# Patient Record
Sex: FEMALE | Race: WHITE | NOT HISPANIC OR LATINO | Employment: OTHER | URBAN - NONMETROPOLITAN AREA
[De-identification: names, ages, dates, MRNs, and addresses within clinical notes are randomized per-mention and may not be internally consistent; named-entity substitution may affect disease eponyms.]

---

## 2020-05-20 ENCOUNTER — APPOINTMENT (EMERGENCY)
Dept: CT IMAGING | Facility: HOSPITAL | Age: 85
End: 2020-05-20
Payer: COMMERCIAL

## 2020-05-20 ENCOUNTER — HOSPITAL ENCOUNTER (EMERGENCY)
Facility: HOSPITAL | Age: 85
Discharge: HOME | End: 2020-05-20
Attending: EMERGENCY MEDICINE | Admitting: EMERGENCY MEDICINE
Payer: COMMERCIAL

## 2020-05-20 VITALS
DIASTOLIC BLOOD PRESSURE: 68 MMHG | TEMPERATURE: 98.4 F | HEART RATE: 60 BPM | RESPIRATION RATE: 18 BRPM | SYSTOLIC BLOOD PRESSURE: 154 MMHG | OXYGEN SATURATION: 99 % | WEIGHT: 137.57 LBS

## 2020-05-20 DIAGNOSIS — M25.552 LEFT HIP PAIN: Primary | ICD-10-CM

## 2020-05-20 LAB
ANION GAP SERPL CALCULATED.3IONS-SCNC: 7 MMOL/L (ref 4–13)
BASOPHILS # BLD AUTO: 0.04 THOUSANDS/ΜL (ref 0–0.1)
BASOPHILS NFR BLD AUTO: 1 % (ref 0–1)
BUN SERPL-MCNC: 17 MG/DL (ref 5–25)
CALCIUM SERPL-MCNC: 8.7 MG/DL (ref 8.3–10.1)
CHLORIDE SERPL-SCNC: 95 MMOL/L (ref 100–108)
CO2 SERPL-SCNC: 29 MMOL/L (ref 21–32)
CREAT SERPL-MCNC: 1.1 MG/DL (ref 0.6–1.3)
EOSINOPHIL # BLD AUTO: 0.31 THOUSAND/ΜL (ref 0–0.61)
EOSINOPHIL NFR BLD AUTO: 5 % (ref 0–6)
ERYTHROCYTE [DISTWIDTH] IN BLOOD BY AUTOMATED COUNT: 12.9 % (ref 11.6–15.1)
GFR SERPL CREATININE-BSD FRML MDRD: 46 ML/MIN/1.73SQ M
GLUCOSE SERPL-MCNC: 101 MG/DL (ref 65–140)
HCT VFR BLD AUTO: 33 % (ref 34.8–46.1)
HGB BLD-MCNC: 11.1 G/DL (ref 11.5–15.4)
IMM GRANULOCYTES # BLD AUTO: 0.02 THOUSAND/UL (ref 0–0.2)
IMM GRANULOCYTES NFR BLD AUTO: 0 % (ref 0–2)
LYMPHOCYTES # BLD AUTO: 1.42 THOUSANDS/ΜL (ref 0.6–4.47)
LYMPHOCYTES NFR BLD AUTO: 24 % (ref 14–44)
MCH RBC QN AUTO: 32.5 PG (ref 26.8–34.3)
MCHC RBC AUTO-ENTMCNC: 33.6 G/DL (ref 31.4–37.4)
MCV RBC AUTO: 97 FL (ref 82–98)
MONOCYTES # BLD AUTO: 0.47 THOUSAND/ΜL (ref 0.17–1.22)
MONOCYTES NFR BLD AUTO: 8 % (ref 4–12)
NEUTROPHILS # BLD AUTO: 3.74 THOUSANDS/ΜL (ref 1.85–7.62)
NEUTS SEG NFR BLD AUTO: 62 % (ref 43–75)
NRBC BLD AUTO-RTO: 0 /100 WBCS
PLATELET # BLD AUTO: 238 THOUSANDS/UL (ref 149–390)
PMV BLD AUTO: 8.4 FL (ref 8.9–12.7)
POTASSIUM SERPL-SCNC: 3.8 MMOL/L (ref 3.5–5.3)
RBC # BLD AUTO: 3.42 MILLION/UL (ref 3.81–5.12)
SODIUM SERPL-SCNC: 131 MMOL/L (ref 136–145)
WBC # BLD AUTO: 6 THOUSAND/UL (ref 4.31–10.16)

## 2020-05-20 PROCEDURE — 96375 TX/PRO/DX INJ NEW DRUG ADDON: CPT

## 2020-05-20 PROCEDURE — 99284 EMERGENCY DEPT VISIT MOD MDM: CPT

## 2020-05-20 PROCEDURE — 85025 COMPLETE CBC W/AUTO DIFF WBC: CPT | Performed by: EMERGENCY MEDICINE

## 2020-05-20 PROCEDURE — 36415 COLL VENOUS BLD VENIPUNCTURE: CPT | Performed by: EMERGENCY MEDICINE

## 2020-05-20 PROCEDURE — 80048 BASIC METABOLIC PNL TOTAL CA: CPT | Performed by: EMERGENCY MEDICINE

## 2020-05-20 PROCEDURE — 99285 EMERGENCY DEPT VISIT HI MDM: CPT | Performed by: EMERGENCY MEDICINE

## 2020-05-20 PROCEDURE — 96361 HYDRATE IV INFUSION ADD-ON: CPT

## 2020-05-20 PROCEDURE — 74176 CT ABD & PELVIS W/O CONTRAST: CPT

## 2020-05-20 PROCEDURE — 96374 THER/PROPH/DIAG INJ IV PUSH: CPT

## 2020-05-20 RX ORDER — LOSARTAN POTASSIUM AND HYDROCHLOROTHIAZIDE 12.5; 1 MG/1; MG/1
1 TABLET ORAL DAILY
COMMUNITY
Start: 2019-08-16

## 2020-05-20 RX ORDER — MORPHINE SULFATE 4 MG/ML
4 INJECTION, SOLUTION INTRAMUSCULAR; INTRAVENOUS ONCE
Status: COMPLETED | OUTPATIENT
Start: 2020-05-20 | End: 2020-05-20

## 2020-05-20 RX ORDER — TRAMADOL HYDROCHLORIDE 50 MG/1
50 TABLET ORAL AS NEEDED
COMMUNITY
Start: 2020-05-18 | End: 2020-05-28

## 2020-05-20 RX ORDER — ONDANSETRON 4 MG/1
4 TABLET, FILM COATED ORAL EVERY 6 HOURS PRN
Qty: 10 TABLET | Refills: 0 | Status: SHIPPED | OUTPATIENT
Start: 2020-05-20

## 2020-05-20 RX ORDER — ONDANSETRON 2 MG/ML
4 INJECTION INTRAMUSCULAR; INTRAVENOUS ONCE
Status: COMPLETED | OUTPATIENT
Start: 2020-05-20 | End: 2020-05-20

## 2020-05-20 RX ORDER — PROPRANOLOL HCL 60 MG
120 CAPSULE, EXTENDED RELEASE 24HR ORAL DAILY
COMMUNITY

## 2020-05-20 RX ORDER — OXYCODONE HYDROCHLORIDE 5 MG/1
5 TABLET ORAL EVERY 6 HOURS PRN
Qty: 8 TABLET | Refills: 0 | Status: SHIPPED | OUTPATIENT
Start: 2020-05-20

## 2020-05-20 RX ORDER — SODIUM CHLORIDE 9 MG/ML
125 INJECTION, SOLUTION INTRAVENOUS CONTINUOUS
Status: DISCONTINUED | OUTPATIENT
Start: 2020-05-20 | End: 2020-05-20 | Stop reason: HOSPADM

## 2020-05-20 RX ORDER — KETOROLAC TROMETHAMINE 30 MG/ML
10 INJECTION, SOLUTION INTRAMUSCULAR; INTRAVENOUS ONCE
Status: COMPLETED | OUTPATIENT
Start: 2020-05-20 | End: 2020-05-20

## 2020-05-20 RX ADMIN — KETOROLAC TROMETHAMINE 9.9 MG: 30 INJECTION, SOLUTION INTRAMUSCULAR at 10:08

## 2020-05-20 RX ADMIN — MORPHINE SULFATE 4 MG: 4 INJECTION INTRAVENOUS at 08:25

## 2020-05-20 RX ADMIN — ONDANSETRON 4 MG: 2 INJECTION INTRAMUSCULAR; INTRAVENOUS at 08:24

## 2020-05-20 RX ADMIN — SODIUM CHLORIDE 125 ML/HR: 0.9 INJECTION, SOLUTION INTRAVENOUS at 08:25

## 2020-05-20 RX ADMIN — MORPHINE SULFATE 2 MG: 2 INJECTION, SOLUTION INTRAMUSCULAR; INTRAVENOUS at 10:08

## 2021-01-28 ENCOUNTER — IMMUNIZATIONS (OUTPATIENT)
Dept: FAMILY MEDICINE CLINIC | Facility: HOSPITAL | Age: 86
End: 2021-01-28

## 2021-01-28 DIAGNOSIS — Z23 ENCOUNTER FOR IMMUNIZATION: Primary | ICD-10-CM

## 2021-01-28 PROCEDURE — 91301 SARS-COV-2 / COVID-19 MRNA VACCINE (MODERNA) 100 MCG: CPT

## 2021-01-28 PROCEDURE — 0011A SARS-COV-2 / COVID-19 MRNA VACCINE (MODERNA) 100 MCG: CPT

## 2021-02-25 ENCOUNTER — IMMUNIZATIONS (OUTPATIENT)
Dept: FAMILY MEDICINE CLINIC | Facility: HOSPITAL | Age: 86
End: 2021-02-25

## 2021-02-25 DIAGNOSIS — Z23 ENCOUNTER FOR IMMUNIZATION: Primary | ICD-10-CM

## 2021-02-25 PROCEDURE — 0012A SARS-COV-2 / COVID-19 MRNA VACCINE (MODERNA) 100 MCG: CPT

## 2021-02-25 PROCEDURE — 91301 SARS-COV-2 / COVID-19 MRNA VACCINE (MODERNA) 100 MCG: CPT

## 2022-02-13 ENCOUNTER — OFFICE VISIT (OUTPATIENT)
Dept: URGENT CARE | Facility: CLINIC | Age: 87
End: 2022-02-13
Payer: COMMERCIAL

## 2022-02-13 ENCOUNTER — APPOINTMENT (OUTPATIENT)
Dept: RADIOLOGY | Facility: CLINIC | Age: 87
End: 2022-02-13
Payer: COMMERCIAL

## 2022-02-13 VITALS — RESPIRATION RATE: 19 BRPM | TEMPERATURE: 99.1 F | OXYGEN SATURATION: 95 % | HEART RATE: 73 BPM

## 2022-02-13 DIAGNOSIS — R05.9 COUGH: ICD-10-CM

## 2022-02-13 DIAGNOSIS — B34.9 VIRAL SYNDROME: Primary | ICD-10-CM

## 2022-02-13 PROCEDURE — 99213 OFFICE O/P EST LOW 20 MIN: CPT

## 2022-02-13 PROCEDURE — 71046 X-RAY EXAM CHEST 2 VIEWS: CPT

## 2022-02-13 PROCEDURE — U0003 INFECTIOUS AGENT DETECTION BY NUCLEIC ACID (DNA OR RNA); SEVERE ACUTE RESPIRATORY SYNDROME CORONAVIRUS 2 (SARS-COV-2) (CORONAVIRUS DISEASE [COVID-19]), AMPLIFIED PROBE TECHNIQUE, MAKING USE OF HIGH THROUGHPUT TECHNOLOGIES AS DESCRIBED BY CMS-2020-01-R: HCPCS

## 2022-02-13 PROCEDURE — U0005 INFEC AGEN DETEC AMPLI PROBE: HCPCS

## 2022-02-13 PROCEDURE — S9083 URGENT CARE CENTER GLOBAL: HCPCS

## 2022-02-13 RX ORDER — RIZATRIPTAN BENZOATE 5 MG/1
TABLET, ORALLY DISINTEGRATING ORAL
COMMUNITY

## 2022-02-13 RX ORDER — PROMETHAZINE HYDROCHLORIDE AND CODEINE PHOSPHATE 6.25; 1 MG/5ML; MG/5ML
SYRUP ORAL
COMMUNITY

## 2022-02-13 NOTE — PATIENT INSTRUCTIONS
You have been swabbed for COVID today  You can expect your results in 24-48 hours  Results will be available on [employer]  I will call you with results also  Please isolate from others until your receive your results  If your results are positive for COVID regardless of vaccination status, you are to isolate for 5 days from symptom onset then continue to wear a mask around others for another 5 days  If you have a fever, continue to stay home until your fever resolves  If you were exposed to someone with COVID and have been boosted or completed the primary series of Pfizer or Moderna vaccine within the last 6 months, or completed the primary series of J&J vaccine within the last 2 months - you are to wear a mask around others for 10 days and test on day 5 if possible  If you completed the primary series of Pfizer or Moderna vaccine over 6 months ago and are not boosted or completed the primary series of J&J over 2 months ago and are not boosted or are not vaccinated - you are to stay home for 5 days then continue to wear a mask around others for another 5 days  If you are unable to quarantine, you must wear a mask for 10 days  You should be fever free for 24 hours without the use of medication before returning to work  Tylenol and ibuprofen as needed for pain and/or fever  Mucinex DM for cough and congestion  Vitamin C 1000 mg, Vitamin D3 2000 units, and Zinc 100 mg help to boost your immunity to fight viruses  Hydrate - water and sports drinks (such as Gatorade, body armour, etc ) are great for hydration  Rest   Follow up with your PCP  Go to ED for worsening symptoms  Viral Syndrome   WHAT YOU NEED TO KNOW:   Viral syndrome is a term used for symptoms of an infection caused by a virus  Viruses are spread easily from person to person through the air and on shared items    DISCHARGE INSTRUCTIONS:   Call your local emergency number (911 in the [address] [place] [address],[address]) or have someone else call if:   · You have a seizure  · You cannot be woken  · You have chest pain or trouble breathing  Return to the emergency department if:   · You have a stiff neck, a bad headache, and sensitivity to light  · You feel weak, dizzy, or confused  · You stop urinating or urinate a lot less than usual     · You cough up blood or thick yellow or green mucus  · You have severe abdominal pain or your abdomen is larger than usual     Call your doctor if:   · Your symptoms do not get better with treatment or get worse after 3 days  · You have a rash or ear pain  · You have burning when you urinate  · You have questions or concerns about your condition or care  Medicines: You may  need any of the following:  · Acetaminophen  decreases pain and fever  It is available without a doctor's order  Ask how much to take and how often to take it  Follow directions  Read the labels of all other medicines you are using to see if they also contain acetaminophen, or ask your doctor or pharmacist  Acetaminophen can cause liver damage if not taken correctly  Do not use more than 4 grams (4,000 milligrams) total of acetaminophen in one day  · NSAIDs , such as ibuprofen, help decrease swelling, pain, and fever  NSAIDs can cause stomach bleeding or kidney problems in certain people  If you take blood thinner medicine, always ask your healthcare provider if NSAIDs are safe for you  Always read the medicine label and follow directions  · Cold medicine  helps decrease swelling, control a cough, and relieve chest or nasal congestion  · Saline nasal spray  helps decrease nasal congestion  · Take your medicine as directed  Contact your healthcare provider if you think your medicine is not helping or if you have side effects  Tell him of her if you are allergic to any medicine  Keep a list of the medicines, vitamins, and herbs you take  Include the amounts, and when and why you take them   Bring the list or the pill bottles to follow-up visits  Carry your medicine list with you in case of an emergency  Manage your symptoms:   · Drink liquids as directed to prevent dehydration  Ask how much liquid to drink each day and which liquids are best for you  Ask if you should drink an oral rehydration solution (ORS)  An ORS has the right amounts of water, salts, and sugar you need to replace body fluids  This may help prevent dehydration caused by vomiting or diarrhea  Do not drink liquids with caffeine  Liquids with caffeine can make dehydration worse  · Get plenty of rest to help your body heal   Take naps throughout the day  Ask your healthcare provider when you can return to work and your normal activities  · Use a cool mist humidifier to help you breathe easier  Ask your healthcare provider how to use a cool mist humidifier  · Eat honey or use cough drops for a sore throat  Cough drops are available without a doctor's order  Follow directions for taking cough drops  · Do not smoke or be close to anyone who is smoking  Nicotine and other chemicals in cigarettes and cigars can cause lung damage  Smoking can also delay healing  Ask your healthcare provider for information if you currently smoke and need help to quit  E-cigarettes or smokeless tobacco still contain nicotine  Talk to your healthcare provider before you use these products  Prevent the spread of germs:       · Wash your hands often  Wash your hands several times each day  Wash after you use the bathroom, change a child's diaper, and before you prepare or eat food  Use soap and water every time  Rub your soapy hands together, lacing your fingers  Wash the front and back of your hands, and in between your fingers  Use the fingers of one hand to scrub under the fingernails of the other hand  Wash for at least 20 seconds  Rinse with warm, running water for several seconds  Then dry your hands with a clean towel or paper towel   Use hand  that contains alcohol if soap and water are not available  Do not touch your eyes, nose, or mouth without washing your hands first          · Cover a sneeze or cough  Use a tissue that covers your mouth and nose  Throw the tissue away in a trash can right away  Use the bend of your arm if a tissue is not available  Wash your hands well with soap and water or use a hand   · Stay away from others while you are sick  Avoid crowds as much as possible  · Ask about vaccines you may need  Talk to your healthcare provider about your vaccine history  He or she will tell you which vaccines you need, and when to get them  ? Get the influenza (flu) vaccine as soon as recommended each year  The flu vaccine is available starting in September or October  Flu viruses change, so it is important to get a flu vaccine every year  ? Get the pneumonia vaccine if recommended  This vaccine is usually recommended every 5 years  Your provider will tell you when to get this vaccine, if needed  Follow up with your doctor as directed:  Write down your questions so you remember to ask them during your visits  © Copyright [employer] 2021 Information is for End User's use only and may not be sold, redistributed or otherwise used for commercial purposes  All illustrations and images included in CareNotes® are the copyrighted property of [employer]  or [facility] Jacobs   The above information is an  only  It is not intended as medical advice for individual conditions or treatments  Talk to your doctor, nurse or pharmacist before following any medical regimen to see if it is safe and effective for you

## 2022-02-13 NOTE — PROGRESS NOTES
[facility]        NAME: Freda Dasilva is a 80 y o  female  : 1934    MRN: [id number]  DATE: 2022  TIME: 4:16 PM    Assessment and Plan   Viral syndrome [B34 9]  1  Viral syndrome  COVID Only -Office Collect    XR chest pa & lateral    dextromethorphan-guaifenesin (MUCINEX DM)  MG per 12 hr tablet    CANCELED: COVID Only -Office Collect     Preliminary chest x-ray - no acute pathology noted  Will review official read when available and notify patient if there are findings and treat accordingly  Patient Instructions     You have been swabbed for COVID today  You can expect your results in 24-48 hours  Results will be available on "[employer]"  I will call you with results also  Please isolate from others until your receive your results  If your results are positive for COVID regardless of vaccination status, you are to isolate for 5 days from symptom onset then continue to wear a mask around others for another 5 days  If you have a fever, continue to stay home until your fever resolves  If you were exposed to someone with COVID and have been boosted or completed the primary series of Pfizer or Moderna vaccine within the last 6 months, or completed the primary series of J&J vaccine within the last 2 months - you are to wear a mask around others for 10 days and test on day 5 if possible  If you completed the primary series of Pfizer or Moderna vaccine over 6 months ago and are not boosted or completed the primary series of J&J over 2 months ago and are not boosted or are not vaccinated - you are to stay home for 5 days then continue to wear a mask around others for another 5 days  If you are unable to quarantine, you must wear a mask for 10 days  You should be fever free for 24 hours without the use of medication before returning to work  Tylenol and ibuprofen as needed for pain and/or fever  Mucinex DM for cough and congestion     Vitamin C 1000 mg, Vitamin D3 2000 units, and Zinc 100 mg help to boost your immunity to fight viruses  Hydrate - water and sports drinks (such as Gatorade, body armour, etc ) are great for hydration  Rest   Follow up with your PCP  Go to ED for worsening symptoms  Chief Complaint     Chief Complaint   Patient presents with    COVID-19     Cough and chest discomfort Feels exhausted  Started friday          History of Present Illness       Patient reports fatigue, sore throat, runny nose, cough, chest congestion, and sinus congestion that started Friday  She reports PCP called her in cough syrup and she notes improvement to cough and congestion  She reports having pneumonia several years ago and is concerned she could have it again  She denies SOB  Review of Systems   Review of Systems   Constitutional: Positive for fatigue  Negative for chills and fever  HENT: Positive for congestion, rhinorrhea and sore throat  Respiratory: Positive for cough  Negative for shortness of breath  Cardiovascular: Negative for chest pain  Gastrointestinal: Negative for diarrhea, nausea and vomiting  Musculoskeletal: Negative for myalgias  Neurological: Negative for headaches  All other systems reviewed and are negative          Current Medications       Current Outpatient Medications:     Cholecalciferol 50 MCG (2000 UT) CAPS, Take 1 tablet by mouth Daily, Disp: , Rfl:     dextromethorphan-guaifenesin (MUCINEX DM)  MG per 12 hr tablet, Take 1 tablet by mouth every 12 (twelve) hours, Disp: 20 tablet, Rfl: 0    losartan-hydrochlorothiazide (HYZAAR) 100-12 5 MG per tablet, Take 1 tablet by mouth daily, Disp: , Rfl:     Multiple Vitamins-Minerals (OCUVITE-LUTEIN PO), Take 1 tablet by mouth daily, Disp: , Rfl:     ondansetron (ZOFRAN) 4 mg tablet, Take 1 tablet (4 mg total) by mouth every 6 (six) hours as needed for nausea or vomiting, Disp: 10 tablet, Rfl: 0    oxyCODONE (ROXICODONE) 5 mg immediate release tablet, Take 1 tablet (5 mg total) by mouth every 6 (six) hours as needed for moderate pain for up to 8 dosesMax Daily Amount: 20 mg, Disp: 8 tablet, Rfl: 0    promethazine-codeine (PHENERGAN WITH CODEINE) 6 25-10 mg/5 mL syrup, , Disp: , Rfl:     propranolol (INDERAL LA) 60 mg 24 hr capsule, Take 120 mg by mouth daily, Disp: , Rfl:     rizatriptan (MAXALT-MLT) 5 MG disintegrating tablet, , Disp: , Rfl:     Current Allergies     Allergies as of 02/13/2022 - Reviewed 05/20/2020   Allergen Reaction Noted    Clarithromycin Dizziness 11/06/2015    Clindamycin  05/20/2020    Levofloxacin Dizziness 11/06/2015            The following portions of the patient's history were reviewed and updated as appropriate: allergies, current medications, past family history, past medical history, past social history, past surgical history and problem list      Past Medical History:   Diagnosis Date    Hypertension     Sciatica        Past Surgical History:   Procedure Laterality Date    APPENDECTOMY         No family history on file  Medications have been verified  Objective   Pulse 73   Temp 99 1 °F (37 3 °C)   Resp 19   LMP  (LMP Unknown)   SpO2 95%        Physical Exam     Physical Exam  Vitals and nursing note reviewed  Constitutional:       General: She is not in acute distress  Appearance: Normal appearance  She is not toxic-appearing  HENT:      Head: Normocephalic and atraumatic  Right Ear: External ear normal       Left Ear: External ear normal       Nose: Congestion and rhinorrhea present  Right Turbinates: Swollen  Left Turbinates: Swollen  Mouth/Throat:      Mouth: Mucous membranes are moist       Pharynx: Oropharynx is clear  No oropharyngeal exudate or posterior oropharyngeal erythema  Eyes:      General: No scleral icterus  Right eye: No discharge  Left eye: No discharge        Conjunctiva/sclera: Conjunctivae normal    Cardiovascular:      Rate and Rhythm: Normal rate and regular rhythm  Heart sounds: Normal heart sounds  Pulmonary:      Effort: Pulmonary effort is normal       Breath sounds: Normal breath sounds  Musculoskeletal:         General: Normal range of motion  Cervical back: Normal range of motion  Lymphadenopathy:      Cervical: No cervical adenopathy  Skin:     General: Skin is warm and dry  Neurological:      General: No focal deficit present  Mental Status: She is alert and oriented to person, place, and time     Psychiatric:         Mood and Affect: Mood normal          Behavior: Behavior normal

## 2022-02-14 LAB — SARS-COV-2 RNA RESP QL NAA+PROBE: NEGATIVE

## 2022-02-16 ENCOUNTER — TELEPHONE (OUTPATIENT)
Dept: URGENT CARE | Facility: CLINIC | Age: 87
End: 2022-02-16

## 2025-03-14 ENCOUNTER — HOSPITAL ENCOUNTER (OUTPATIENT)
Dept: RADIOLOGY | Facility: HOSPITAL | Age: OVER 89
End: 2025-03-14
Payer: COMMERCIAL

## 2025-03-14 DIAGNOSIS — R13.19 ESOPHAGEAL DYSPHAGIA: ICD-10-CM

## 2025-03-14 PROCEDURE — 74220 X-RAY XM ESOPHAGUS 1CNTRST: CPT

## 2025-04-09 ENCOUNTER — HOSPITAL ENCOUNTER (OUTPATIENT)
Dept: NON INVASIVE DIAGNOSTICS | Facility: HOSPITAL | Age: OVER 89
Discharge: HOME | End: 2025-04-09
Payer: COMMERCIAL

## 2025-04-09 VITALS
HEART RATE: 80 BPM | SYSTOLIC BLOOD PRESSURE: 154 MMHG | WEIGHT: 123 LBS | HEIGHT: 62 IN | BODY MASS INDEX: 22.63 KG/M2 | DIASTOLIC BLOOD PRESSURE: 68 MMHG

## 2025-04-09 DIAGNOSIS — R06.00 DYSPNEA, UNSPECIFIED TYPE: ICD-10-CM

## 2025-04-09 LAB
AORTIC ROOT: 2.9 CM
AORTIC VALVE MEAN VELOCITY: 11.5 M/S
ASCENDING AORTA: 3.2 CM
AV AREA BY CONTINUOUS VTI: 2.5 CM2
AV AREA PEAK VELOCITY: 2.1 CM2
AV LVOT MEAN GRADIENT: 3 MMHG
AV LVOT PEAK GRADIENT: 5 MMHG
AV MEAN PRESS GRAD SYS DOP V1V2: 6 MMHG
AV ORIFICE AREA US: 2.52 CM2
AV PEAK GRADIENT: 11 MMHG
AV VELOCITY RATIO: 0.8
AV VMAX SYS DOP: 1.64 M/S
BSA FOR ECHO PROCEDURE: 1.55 M2
DOP CALC AO VTI: 32.69 CM
DOP CALC LVOT AREA: 3.14 CM2
DOP CALC LVOT CARDIAC INDEX: 3.59 L/MIN/M2
DOP CALC LVOT CARDIAC OUTPUT: 5.64 L/MIN
DOP CALC LVOT DIAMETER: 2 CM
DOP CALC LVOT PEAK VEL VTI: 26.25 CM
DOP CALC LVOT PEAK VEL: 1.11 M/S
DOP CALC LVOT STROKE VOLUME: 82.43
E WAVE DECELERATION TIME: 148 MS
E/A RATIO: 2.21
FRACTIONAL SHORTENING: 42 (ref 28–44)
INTERVENTRICULAR SEPTUM IN DIASTOLE (PARASTERNAL SHORT AXIS VIEW): 1.3 CM
INTERVENTRICULAR SEPTUM: 1.3 CM (ref 0.6–1.1)
LAAS-AP2: 24 CM2
LAAS-AP4: 17.7 CM2
LEFT ATRIUM SIZE: 4.6 CM
LEFT ATRIUM VOLUME (MOD BIPLANE): 68 ML
LEFT INTERNAL DIMENSION IN SYSTOLE: 2.5 CM (ref 2.1–4)
LEFT VENTRICULAR INTERNAL DIMENSION IN DIASTOLE: 4.3 CM (ref 3.5–6)
LEFT VENTRICULAR POSTERIOR WALL IN END DIASTOLE: 1.4 CM
LEFT VENTRICULAR STROKE VOLUME: 62 ML
LV EF US.2D.A4C+ESTIMATED: 81 %
LVSV (TEICH): 62 ML
MV E'TISSUE VEL-SEP: 6 CM/S
MV EROA: 0.1 CM2
MV PEAK A VEL: 0.81 M/S
MV PEAK E VEL: 179 CM/S
MV REGURGITANT VOLUME: 14 ML
MV STENOSIS PRESSURE HALF TIME: 43 MS
MV VALVE AREA P 1/2 METHOD: 5.12
PISA MRMAX VEL: 0.29 M/S
PISA RADIUS: 0.5 CM
RIGHT ATRIUM AREA SYSTOLE A4C: 10.9 CM2
RIGHT VENTRICLE ID DIMENSION: 3.1 CM
SINOTUBULAR JUNCTION: 2.4 CM
SL CV LEFT ATRIUM LENGTH A2C: 5.3 CM
SL CV LV EF: 65
SL CV PED ECHO LEFT VENTRICLE DIASTOLIC VOLUME (MOD BIPLANE) 2D: 84 ML
SL CV PED ECHO LEFT VENTRICLE SYSTOLIC VOLUME (MOD BIPLANE) 2D: 22 ML
SL CV SINUS OF VALSALVA 2D: 2.7 CM
STJ: 2.4 CM
TR MAX PG: 53 MMHG
TR PEAK VELOCITY: 3.7 M/S
TRICUSPID ANNULAR PLANE SYSTOLIC EXCURSION: 1.9 CM
TRICUSPID VALVE PEAK REGURGITATION VELOCITY: 3.65 M/S

## 2025-04-09 PROCEDURE — 93306 TTE W/DOPPLER COMPLETE: CPT

## 2025-04-09 PROCEDURE — 93306 TTE W/DOPPLER COMPLETE: CPT | Performed by: INTERNAL MEDICINE

## 2025-06-18 ENCOUNTER — APPOINTMENT (EMERGENCY)
Dept: CT IMAGING | Facility: HOSPITAL | Age: OVER 89
DRG: 194 | End: 2025-06-18
Payer: COMMERCIAL

## 2025-06-18 ENCOUNTER — HOSPITAL ENCOUNTER (INPATIENT)
Facility: HOSPITAL | Age: OVER 89
LOS: 3 days | Discharge: HOME | DRG: 194 | End: 2025-06-21
Attending: EMERGENCY MEDICINE | Admitting: INTERNAL MEDICINE
Payer: COMMERCIAL

## 2025-06-18 DIAGNOSIS — E87.1 HYPONATREMIA: ICD-10-CM

## 2025-06-18 DIAGNOSIS — J18.9 PNEUMONIA: Primary | ICD-10-CM

## 2025-06-18 DIAGNOSIS — I10 ESSENTIAL HYPERTENSION: ICD-10-CM

## 2025-06-18 LAB
2HR DELTA HS TROPONIN: 0 NG/L
ALBUMIN SERPL BCG-MCNC: 4.1 G/DL (ref 3.5–5)
ALP SERPL-CCNC: 58 U/L (ref 34–104)
ALT SERPL W P-5'-P-CCNC: 26 U/L (ref 7–52)
ANION GAP SERPL CALCULATED.3IONS-SCNC: 9 MMOL/L (ref 4–13)
APTT PPP: 29 SECONDS (ref 23–34)
AST SERPL W P-5'-P-CCNC: 29 U/L (ref 13–39)
ATRIAL RATE: 93 BPM
BASOPHILS # BLD AUTO: 0.03 THOUSANDS/ÂΜL (ref 0–0.1)
BASOPHILS NFR BLD AUTO: 0 % (ref 0–1)
BILIRUB SERPL-MCNC: 1.09 MG/DL (ref 0.2–1)
BNP SERPL-MCNC: 302 PG/ML (ref 0–100)
BUN SERPL-MCNC: 24 MG/DL (ref 5–25)
CALCIUM SERPL-MCNC: 9.1 MG/DL (ref 8.4–10.2)
CARDIAC TROPONIN I PNL SERPL HS: 16 NG/L (ref ?–50)
CARDIAC TROPONIN I PNL SERPL HS: 16 NG/L (ref ?–50)
CHLORIDE SERPL-SCNC: 94 MMOL/L (ref 96–108)
CO2 SERPL-SCNC: 29 MMOL/L (ref 21–32)
CREAT SERPL-MCNC: 1.13 MG/DL (ref 0.6–1.3)
EOSINOPHIL # BLD AUTO: 0.05 THOUSAND/ÂΜL (ref 0–0.61)
EOSINOPHIL NFR BLD AUTO: 1 % (ref 0–6)
ERYTHROCYTE [DISTWIDTH] IN BLOOD BY AUTOMATED COUNT: 13.3 % (ref 11.6–15.1)
FLUAV AG UPPER RESP QL IA.RAPID: NEGATIVE
FLUBV AG UPPER RESP QL IA.RAPID: NEGATIVE
GFR SERPL CREATININE-BSD FRML MDRD: 42 ML/MIN/1.73SQ M
GLUCOSE SERPL-MCNC: 109 MG/DL (ref 65–140)
HCT VFR BLD AUTO: 34.1 % (ref 34.8–46.1)
HGB BLD-MCNC: 11.4 G/DL (ref 11.5–15.4)
IMM GRANULOCYTES # BLD AUTO: 0.03 THOUSAND/UL (ref 0–0.2)
IMM GRANULOCYTES NFR BLD AUTO: 0 % (ref 0–2)
INR PPP: 0.99 (ref 0.85–1.19)
LACTATE SERPL-SCNC: 1.1 MMOL/L (ref 0.5–2)
LYMPHOCYTES # BLD AUTO: 0.97 THOUSANDS/ÂΜL (ref 0.6–4.47)
LYMPHOCYTES NFR BLD AUTO: 11 % (ref 14–44)
MAGNESIUM SERPL-MCNC: 1.8 MG/DL (ref 1.9–2.7)
MCH RBC QN AUTO: 33.2 PG (ref 26.8–34.3)
MCHC RBC AUTO-ENTMCNC: 33.4 G/DL (ref 31.4–37.4)
MCV RBC AUTO: 99 FL (ref 82–98)
MONOCYTES # BLD AUTO: 0.62 THOUSAND/ÂΜL (ref 0.17–1.22)
MONOCYTES NFR BLD AUTO: 7 % (ref 4–12)
NEUTROPHILS # BLD AUTO: 7.48 THOUSANDS/ÂΜL (ref 1.85–7.62)
NEUTS SEG NFR BLD AUTO: 81 % (ref 43–75)
NRBC BLD AUTO-RTO: 0 /100 WBCS
P AXIS: 12 DEGREES
PLATELET # BLD AUTO: 204 THOUSANDS/UL (ref 149–390)
PMV BLD AUTO: 8.5 FL (ref 8.9–12.7)
POTASSIUM SERPL-SCNC: 3.9 MMOL/L (ref 3.5–5.3)
PR INTERVAL: 156 MS
PROCALCITONIN SERPL-MCNC: 0.53 NG/ML
PROT SERPL-MCNC: 7.3 G/DL (ref 6.4–8.4)
PROTHROMBIN TIME: 13.5 SECONDS (ref 12.3–15)
QRS AXIS: -9 DEGREES
QRSD INTERVAL: 102 MS
QT INTERVAL: 360 MS
QTC INTERVAL: 447 MS
RBC # BLD AUTO: 3.43 MILLION/UL (ref 3.81–5.12)
SARS-COV+SARS-COV-2 AG RESP QL IA.RAPID: NEGATIVE
SODIUM SERPL-SCNC: 132 MMOL/L (ref 135–147)
T WAVE AXIS: 8 DEGREES
VENTRICULAR RATE: 93 BPM
WBC # BLD AUTO: 9.18 THOUSAND/UL (ref 4.31–10.16)

## 2025-06-18 PROCEDURE — 87811 SARS-COV-2 COVID19 W/OPTIC: CPT | Performed by: EMERGENCY MEDICINE

## 2025-06-18 PROCEDURE — 96365 THER/PROPH/DIAG IV INF INIT: CPT

## 2025-06-18 PROCEDURE — 99223 1ST HOSP IP/OBS HIGH 75: CPT | Performed by: NURSE PRACTITIONER

## 2025-06-18 PROCEDURE — 71275 CT ANGIOGRAPHY CHEST: CPT

## 2025-06-18 PROCEDURE — 93005 ELECTROCARDIOGRAM TRACING: CPT

## 2025-06-18 PROCEDURE — 83735 ASSAY OF MAGNESIUM: CPT | Performed by: EMERGENCY MEDICINE

## 2025-06-18 PROCEDURE — 99285 EMERGENCY DEPT VISIT HI MDM: CPT | Performed by: EMERGENCY MEDICINE

## 2025-06-18 PROCEDURE — 74177 CT ABD & PELVIS W/CONTRAST: CPT

## 2025-06-18 PROCEDURE — 96375 TX/PRO/DX INJ NEW DRUG ADDON: CPT

## 2025-06-18 PROCEDURE — 85730 THROMBOPLASTIN TIME PARTIAL: CPT | Performed by: EMERGENCY MEDICINE

## 2025-06-18 PROCEDURE — 80053 COMPREHEN METABOLIC PANEL: CPT | Performed by: EMERGENCY MEDICINE

## 2025-06-18 PROCEDURE — 84145 PROCALCITONIN (PCT): CPT | Performed by: EMERGENCY MEDICINE

## 2025-06-18 PROCEDURE — 83880 ASSAY OF NATRIURETIC PEPTIDE: CPT | Performed by: EMERGENCY MEDICINE

## 2025-06-18 PROCEDURE — 84484 ASSAY OF TROPONIN QUANT: CPT | Performed by: EMERGENCY MEDICINE

## 2025-06-18 PROCEDURE — 87040 BLOOD CULTURE FOR BACTERIA: CPT | Performed by: EMERGENCY MEDICINE

## 2025-06-18 PROCEDURE — 83605 ASSAY OF LACTIC ACID: CPT | Performed by: EMERGENCY MEDICINE

## 2025-06-18 PROCEDURE — 99285 EMERGENCY DEPT VISIT HI MDM: CPT

## 2025-06-18 PROCEDURE — 36415 COLL VENOUS BLD VENIPUNCTURE: CPT | Performed by: EMERGENCY MEDICINE

## 2025-06-18 PROCEDURE — 85025 COMPLETE CBC W/AUTO DIFF WBC: CPT | Performed by: EMERGENCY MEDICINE

## 2025-06-18 PROCEDURE — 87804 INFLUENZA ASSAY W/OPTIC: CPT | Performed by: EMERGENCY MEDICINE

## 2025-06-18 PROCEDURE — 94640 AIRWAY INHALATION TREATMENT: CPT

## 2025-06-18 PROCEDURE — 85610 PROTHROMBIN TIME: CPT | Performed by: EMERGENCY MEDICINE

## 2025-06-18 PROCEDURE — 93010 ELECTROCARDIOGRAM REPORT: CPT | Performed by: INTERNAL MEDICINE

## 2025-06-18 RX ORDER — IPRATROPIUM BROMIDE AND ALBUTEROL SULFATE 2.5; .5 MG/3ML; MG/3ML
3 SOLUTION RESPIRATORY (INHALATION) ONCE
Status: COMPLETED | OUTPATIENT
Start: 2025-06-18 | End: 2025-06-18

## 2025-06-18 RX ORDER — CEFTRIAXONE 1 G/50ML
1000 INJECTION, SOLUTION INTRAVENOUS EVERY 24 HOURS
Status: DISCONTINUED | OUTPATIENT
Start: 2025-06-19 | End: 2025-06-21 | Stop reason: HOSPADM

## 2025-06-18 RX ORDER — VANCOMYCIN HYDROCHLORIDE 1 G/200ML
15 INJECTION, SOLUTION INTRAVENOUS ONCE
Status: COMPLETED | OUTPATIENT
Start: 2025-06-18 | End: 2025-06-18

## 2025-06-18 RX ORDER — GUAIFENESIN 600 MG/1
600 TABLET, EXTENDED RELEASE ORAL 2 TIMES DAILY
Status: DISCONTINUED | OUTPATIENT
Start: 2025-06-18 | End: 2025-06-21 | Stop reason: HOSPADM

## 2025-06-18 RX ORDER — BENZONATATE 100 MG/1
100 CAPSULE ORAL ONCE
Status: COMPLETED | OUTPATIENT
Start: 2025-06-18 | End: 2025-06-18

## 2025-06-18 RX ORDER — ACETAMINOPHEN 325 MG/1
650 TABLET ORAL EVERY 6 HOURS PRN
Status: DISCONTINUED | OUTPATIENT
Start: 2025-06-18 | End: 2025-06-21 | Stop reason: HOSPADM

## 2025-06-18 RX ORDER — METHYLPREDNISOLONE SODIUM SUCCINATE 125 MG/2ML
125 INJECTION, POWDER, LYOPHILIZED, FOR SOLUTION INTRAMUSCULAR; INTRAVENOUS ONCE
Status: COMPLETED | OUTPATIENT
Start: 2025-06-18 | End: 2025-06-18

## 2025-06-18 RX ORDER — ENOXAPARIN SODIUM 100 MG/ML
30 INJECTION SUBCUTANEOUS DAILY
Status: DISCONTINUED | OUTPATIENT
Start: 2025-06-19 | End: 2025-06-21 | Stop reason: HOSPADM

## 2025-06-18 RX ORDER — CEFTRIAXONE 1 G/50ML
1000 INJECTION, SOLUTION INTRAVENOUS ONCE
Status: DISCONTINUED | OUTPATIENT
Start: 2025-06-18 | End: 2025-06-18

## 2025-06-18 RX ADMIN — GUAIFENESIN 600 MG: 600 TABLET ORAL at 22:18

## 2025-06-18 RX ADMIN — BENZONATATE 100 MG: 100 CAPSULE ORAL at 16:49

## 2025-06-18 RX ADMIN — SODIUM CHLORIDE 500 ML: 0.9 INJECTION, SOLUTION INTRAVENOUS at 20:13

## 2025-06-18 RX ADMIN — PIPERACILLIN AND TAZOBACTAM 4.5 G: 36; 4.5 INJECTION, POWDER, FOR SOLUTION INTRAVENOUS at 19:49

## 2025-06-18 RX ADMIN — IPRATROPIUM BROMIDE AND ALBUTEROL SULFATE 3 ML: .5; 3 SOLUTION RESPIRATORY (INHALATION) at 16:52

## 2025-06-18 RX ADMIN — IOHEXOL 75 ML: 350 INJECTION, SOLUTION INTRAVENOUS at 17:47

## 2025-06-18 RX ADMIN — VANCOMYCIN HYDROCHLORIDE 1000 MG: 1 INJECTION, SOLUTION INTRAVENOUS at 20:09

## 2025-06-18 RX ADMIN — METHYLPREDNISOLONE SODIUM SUCCINATE 125 MG: 125 INJECTION, POWDER, FOR SOLUTION INTRAMUSCULAR; INTRAVENOUS at 16:51

## 2025-06-19 PROBLEM — E87.1 HYPONATREMIA: Status: ACTIVE | Noted: 2025-06-19

## 2025-06-19 PROBLEM — I10 ESSENTIAL HYPERTENSION: Status: ACTIVE | Noted: 2025-06-19

## 2025-06-19 LAB
BACTERIA UR QL AUTO: ABNORMAL /HPF
BILIRUB UR QL STRIP: NEGATIVE
CLARITY UR: CLEAR
COLOR UR: ABNORMAL
GLUCOSE UR STRIP-MCNC: NEGATIVE MG/DL
HGB UR QL STRIP.AUTO: ABNORMAL
KETONES UR STRIP-MCNC: NEGATIVE MG/DL
L PNEUMO1 AG UR QL IA.RAPID: NEGATIVE
LEUKOCYTE ESTERASE UR QL STRIP: NEGATIVE
NITRITE UR QL STRIP: NEGATIVE
NON-SQ EPI CELLS URNS QL MICRO: ABNORMAL /HPF
PH UR STRIP.AUTO: 6 [PH]
PROCALCITONIN SERPL-MCNC: 0.63 NG/ML
PROT UR STRIP-MCNC: ABNORMAL MG/DL
RBC #/AREA URNS AUTO: ABNORMAL /HPF
S PNEUM AG UR QL: POSITIVE
SP GR UR STRIP.AUTO: 1.01 (ref 1–1.03)
UROBILINOGEN UR QL STRIP.AUTO: 0.2 E.U./DL
WBC #/AREA URNS AUTO: ABNORMAL /HPF

## 2025-06-19 PROCEDURE — 87205 SMEAR GRAM STAIN: CPT | Performed by: NURSE PRACTITIONER

## 2025-06-19 PROCEDURE — 87070 CULTURE OTHR SPECIMN AEROBIC: CPT | Performed by: NURSE PRACTITIONER

## 2025-06-19 PROCEDURE — 84145 PROCALCITONIN (PCT): CPT | Performed by: NURSE PRACTITIONER

## 2025-06-19 PROCEDURE — 87449 NOS EACH ORGANISM AG IA: CPT | Performed by: NURSE PRACTITIONER

## 2025-06-19 PROCEDURE — 99232 SBSQ HOSP IP/OBS MODERATE 35: CPT | Performed by: FAMILY MEDICINE

## 2025-06-19 PROCEDURE — 81001 URINALYSIS AUTO W/SCOPE: CPT | Performed by: EMERGENCY MEDICINE

## 2025-06-19 RX ORDER — LOSARTAN POTASSIUM 50 MG/1
50 TABLET ORAL DAILY
Status: DISCONTINUED | OUTPATIENT
Start: 2025-06-20 | End: 2025-06-21 | Stop reason: HOSPADM

## 2025-06-19 RX ORDER — FUROSEMIDE 40 MG/1
1 TABLET ORAL DAILY
COMMUNITY
Start: 2025-05-31

## 2025-06-19 RX ORDER — PROPRANOLOL HYDROCHLORIDE 60 MG/1
120 CAPSULE, EXTENDED RELEASE ORAL DAILY
Status: DISCONTINUED | OUTPATIENT
Start: 2025-06-19 | End: 2025-06-21 | Stop reason: HOSPADM

## 2025-06-19 RX ORDER — DOXYCYCLINE 100 MG/1
100 CAPSULE ORAL EVERY 12 HOURS SCHEDULED
Status: DISCONTINUED | OUTPATIENT
Start: 2025-06-19 | End: 2025-06-21 | Stop reason: HOSPADM

## 2025-06-19 RX ORDER — LOSARTAN POTASSIUM 25 MG/1
12.5 TABLET ORAL DAILY
Status: DISCONTINUED | OUTPATIENT
Start: 2025-06-19 | End: 2025-06-19

## 2025-06-19 RX ORDER — LOSARTAN POTASSIUM 50 MG/1
100 TABLET ORAL DAILY
Status: DISCONTINUED | OUTPATIENT
Start: 2025-06-19 | End: 2025-06-19

## 2025-06-19 RX ADMIN — LOSARTAN POTASSIUM 100 MG: 50 TABLET, FILM COATED ORAL at 09:11

## 2025-06-19 RX ADMIN — GUAIFENESIN 600 MG: 600 TABLET ORAL at 17:36

## 2025-06-19 RX ADMIN — PROPRANOLOL HYDROCHLORIDE 120 MG: 60 CAPSULE, EXTENDED RELEASE ORAL at 09:11

## 2025-06-19 RX ADMIN — Medication 1000 UNITS: at 09:11

## 2025-06-19 RX ADMIN — ENOXAPARIN SODIUM 30 MG: 30 INJECTION SUBCUTANEOUS at 09:12

## 2025-06-19 RX ADMIN — DOXYCYCLINE HYCLATE 100 MG: 100 CAPSULE ORAL at 11:16

## 2025-06-19 RX ADMIN — DOXYCYCLINE HYCLATE 100 MG: 100 CAPSULE ORAL at 20:22

## 2025-06-19 RX ADMIN — GUAIFENESIN 600 MG: 600 TABLET ORAL at 09:11

## 2025-06-19 RX ADMIN — CEFTRIAXONE 1000 MG: 1 INJECTION, SOLUTION INTRAVENOUS at 09:12

## 2025-06-19 NOTE — PLAN OF CARE
Problem: PAIN - ADULT  Goal: Verbalizes/displays adequate comfort level or baseline comfort level  Description: Interventions:  - Encourage patient to monitor pain and request assistance  - Assess pain using appropriate pain scale  - Administer analgesics as ordered based on type and severity of pain and evaluate response  - Implement non-pharmacological measures as appropriate and evaluate response  - Consider cultural and social influences on pain and pain management  - Notify physician/advanced practitioner if interventions unsuccessful or patient reports new pain  - Educate patient/family on pain management process including their role and importance of  reporting pain   - Provide non-pharmacologic/complimentary pain relief interventions  Outcome: Progressing     Problem: INFECTION - ADULT  Goal: Absence or prevention of progression during hospitalization  Description: INTERVENTIONS:  - Assess and monitor for signs and symptoms of infection  - Monitor lab/diagnostic results  - Monitor all insertion sites, i.e. indwelling lines, tubes, and drains  - Monitor endotracheal if appropriate and nasal secretions for changes in amount and color  - Salley appropriate cooling/warming therapies per order  - Administer medications as ordered  - Instruct and encourage patient and family to use good hand hygiene technique  - Identify and instruct in appropriate isolation precautions for identified infection/condition  Outcome: Progressing  Goal: Absence of fever/infection during neutropenic period  Description: INTERVENTIONS:  - Monitor WBC  - Perform strict hand hygiene  - Limit to healthy visitors only  - No plants, dried, fresh or silk flowers with wilder in patient room  Outcome: Progressing     Problem: SAFETY ADULT  Goal: Patient will remain free of falls  Description: INTERVENTIONS:  - Educate patient/family on patient safety including physical limitations  - Instruct patient to call for assistance with activity   -  Consider consulting OT/PT to assist with strengthening/mobility based on AM PAC & JH-HLM score  - Consult OT/PT to assist with strengthening/mobility   - Keep Call bell within reach  - Keep bed low and locked with side rails adjusted as appropriate  - Keep care items and personal belongings within reach  - Initiate and maintain comfort rounds  - Make Fall Risk Sign visible to staff  - Offer Toileting every 2 Hours, in advance of need  - Initiate/Maintain BED AND CHAIR alarm  - Obtain necessary fall risk management equipment:   - Apply yellow socks and bracelet for high fall risk patients  - Consider moving patient to room near nurses station  Outcome: Progressing  Goal: Maintain or return to baseline ADL function  Description: INTERVENTIONS:  -  Assess patient's ability to carry out ADLs; assess patient's baseline for ADL function and identify physical deficits which impact ability to perform ADLs (bathing, care of mouth/teeth, toileting, grooming, dressing, etc.)  - Assess/evaluate cause of self-care deficits   - Assess range of motion  - Assess patient's mobility; develop plan if impaired  - Assess patient's need for assistive devices and provide as appropriate  - Encourage maximum independence but intervene and supervise when necessary  - Involve family in performance of ADLs  - Assess for home care needs following discharge   - Consider OT consult to assist with ADL evaluation and planning for discharge  - Provide patient education as appropriate  - Monitor functional capacity and physical performance, use of AM PAC & JH-HLM   - Monitor gait, balance and fatigue with ambulation    Outcome: Progressing  Goal: Maintains/Returns to pre admission functional level  Description: INTERVENTIONS:  - Perform AM-PAC 6 Click Basic Mobility/ Daily Activity assessment daily.  - Set and communicate daily mobility goal to care team and patient/family/caregiver.   - Collaborate with rehabilitation services on mobility goals if  consulted  - Perform Range of Motion 3 times a day.  - Reposition patient every 2 hours.  - Dangle patient 3 times a day  - Stand patient 3 times a day  - Ambulate patient 3 times a day  - Out of bed to chair 3 times a day   - Out of bed for meals 3 times a day  - Out of bed for toileting  - Record patient progress and toleration of activity level   Outcome: Progressing     Problem: DISCHARGE PLANNING  Goal: Discharge to home or other facility with appropriate resources  Description: INTERVENTIONS:  - Identify barriers to discharge w/patient and caregiver  - Arrange for needed discharge resources and transportation as appropriate  - Identify discharge learning needs (meds, wound care, etc.)  - Arrange for interpretive services to assist at discharge as needed  - Refer to Case Management Department for coordinating discharge planning if the patient needs post-hospital services based on physician/advanced practitioner order or complex needs related to functional status, cognitive ability, or social support system  Outcome: Progressing     Problem: Knowledge Deficit  Goal: Patient/family/caregiver demonstrates understanding of disease process, treatment plan, medications, and discharge instructions  Description: Complete learning assessment and assess knowledge base.  Interventions:  - Provide teaching at level of understanding  - Provide teaching via preferred learning methods  Outcome: Progressing

## 2025-06-19 NOTE — ED PROVIDER NOTES
Time reflects when diagnosis was documented in both MDM as applicable and the Disposition within this note       Time User Action Codes Description Comment    6/18/2025  8:04 PM Benson Castellanos Add [J18.9] Pneumonia           ED Disposition       ED Disposition   Admit    Condition   Stable    Date/Time   Wed Jun 18, 2025  8:05 PM    Comment   Discussed case with hospital medicine, patient will be admitted inpatient for further evaluation of pneumonia.             Assessment & Plan       Medical Decision Making  89-year-old female presents to the emergency department with complaint of cough, productive of mucus, differential diagnosis include COVID, flu, pneumonia, bronchitis, ACS, congestive heart failure, COPD exacerbation, will perform CT scan of the chest, labs, provide patient with breathing treatments, steroids, fluids, antibiotics, and reassess.    Patient found to have multifocal pneumonia.  Discussed case with hospital medicine, patient will be admitted for further evaluation.    Amount and/or Complexity of Data Reviewed  Labs: ordered.  Radiology: ordered.    Risk  Prescription drug management.  Decision regarding hospitalization.             Medications   vancomycin (VANCOCIN) IVPB (premix in dextrose) 1,000 mg 200 mL (has no administration in time range)   piperacillin-tazobactam (ZOSYN) IVPB 4.5 g (4.5 g Intravenous New Bag 6/18/25 1949)   sodium chloride 0.9 % bolus 500 mL (has no administration in time range)   ipratropium-albuterol (DUO-NEB) 0.5-2.5 mg/3 mL inhalation solution 3 mL (3 mL Nebulization Given 6/18/25 1652)   benzonatate (TESSALON PERLES) capsule 100 mg (100 mg Oral Given 6/18/25 1649)   methylPREDNISolone sodium succinate (Solu-MEDROL) injection 125 mg (125 mg Intravenous Given 6/18/25 1651)   iohexol (OMNIPAQUE) 350 MG/ML injection (MULTI-DOSE) 75 mL (75 mL Intravenous Given 6/18/25 1747)       ED Risk Strat Scores   HEART Risk Score      Flowsheet Row Most Recent Value   Heart Score  Risk Calculator    History 0 Filed at: 06/18/2025 2006   ECG 0 Filed at: 06/18/2025 2006   Age 0 Filed at: 06/18/2025 2006   Risk Factors 1 Filed at: 06/18/2025 2006   Troponin 0 Filed at: 06/18/2025 2006   HEART Score 1 Filed at: 06/18/2025 2006          HEART Risk Score      Flowsheet Row Most Recent Value   Heart Score Risk Calculator    History 0 Filed at: 06/18/2025 2006   ECG 0 Filed at: 06/18/2025 2006   Age 0 Filed at: 06/18/2025 2006   Risk Factors 1 Filed at: 06/18/2025 2006   Troponin 0 Filed at: 06/18/2025 2006   HEART Score 1 Filed at: 06/18/2025 2006                      (ISAR) Identification of Seniors at Risk  Before the illness or injury that brought you to the Emergency, did you need someone to help you on a regular basis?: 0  In the last 24 hours, have you needed more help than usual?: 0  Have you been hospitalized for one or more nights during the past 6 months?: 0  In general, do you see well?: 0  In general, do you have serious problems with your memory?: 0  Do you take more than three different medications every day?: 0  ISAR Score: 0            SBIRT 20yo+      Flowsheet Row Most Recent Value   Initial Alcohol Screen: US AUDIT-C     1. How often do you have a drink containing alcohol? 0 Filed at: 06/18/2025 1636   2. How many drinks containing alcohol do you have on a typical day you are drinking?  0 Filed at: 06/18/2025 1636   3b. FEMALE Any Age, or MALE 65+: How often do you have 4 or more drinks on one occassion? 0 Filed at: 06/18/2025 1636   Audit-C Score 0 Filed at: 06/18/2025 1636   LALITA: How many times in the past year have you...    Used an illegal drug or used a prescription medication for non-medical reasons? Never Filed at: 06/18/2025 1636                            History of Present Illness       Chief Complaint   Patient presents with    Cough     Pt states they started with a cough, congestion, and chills on Sunday - SOB with exertion        Past Medical History[1]   Past  Surgical History[2]   Family History[3]   Social History[4]   E-Cigarette/Vaping    E-Cigarette Use Never User       E-Cigarette/Vaping Substances      I have reviewed and agree with the history as documented.     89-year-old female presents to the emergency department with complaint of cough, congestion, chills going on for the past week.  Patient has also been having shortness of breath with exertion.  She denies any chest pain, GI or  complaints.  On initial evaluation, patient does have wheezes, rhonchi.  Patient is satting 90% on room air.      Cough  Associated symptoms: shortness of breath    Associated symptoms: no chest pain, no chills, no ear pain, no fever, no rash and no sore throat        Review of Systems   Constitutional:  Negative for chills and fever.   HENT:  Negative for ear pain and sore throat.    Eyes:  Negative for pain and visual disturbance.   Respiratory:  Positive for cough and shortness of breath.    Cardiovascular:  Negative for chest pain and palpitations.   Gastrointestinal:  Negative for abdominal pain and vomiting.   Genitourinary:  Negative for dysuria and hematuria.   Musculoskeletal:  Negative for arthralgias and back pain.   Skin:  Negative for color change and rash.   Neurological:  Negative for seizures and syncope.   All other systems reviewed and are negative.          Objective       ED Triage Vitals   Temperature Pulse Blood Pressure Respirations SpO2 Patient Position - Orthostatic VS   06/18/25 1636 06/18/25 1636 06/18/25 1636 06/18/25 1636 06/18/25 1636 06/18/25 1645   98.9 °F (37.2 °C) 72 125/69 18 97 % Lying      Temp Source Heart Rate Source BP Location FiO2 (%) Pain Score    06/18/25 1636 06/18/25 1636 06/18/25 1645 -- 06/18/25 1945    Temporal Monitor Right arm  No Pain      Vitals      Date and Time Temp Pulse SpO2 Resp BP Pain Score FACES Pain Rating User   06/18/25 1945 -- 89 93 % 16 108/59 No Pain -- AR   06/18/25 1915 -- 91 92 % 18 99/53 -- -- NB   06/18/25  1845 -- 91 90 % 18 105/50 -- -- NB   06/18/25 1830 -- 91 92 % 18 99/57 -- -- NB   06/18/25 1815 -- 90 91 % 18 109/54 -- -- NB   06/18/25 1800 -- 91 92 % 18 111/59 -- -- NB   06/18/25 1730 -- 88 94 % 18 122/62 -- -- NB   06/18/25 1715 -- 95 93 % 18 118/71 -- -- NB   06/18/25 1700 -- 91 94 % 20 127/58 -- -- NB   06/18/25 1645 -- 98 94 % 18 125/69 -- -- NB   06/18/25 1636 98.9 °F (37.2 °C) 72 97 % 18 125/69 -- --             Physical Exam  Vitals and nursing note reviewed.   Constitutional:       General: She is not in acute distress.     Appearance: She is well-developed.   HENT:      Head: Normocephalic and atraumatic.     Eyes:      Conjunctiva/sclera: Conjunctivae normal.       Cardiovascular:      Rate and Rhythm: Normal rate and regular rhythm.   Pulmonary:      Effort: Pulmonary effort is normal. No respiratory distress.      Breath sounds: Wheezing and rhonchi present.   Abdominal:      Palpations: Abdomen is soft.      Tenderness: There is no abdominal tenderness.     Musculoskeletal:         General: No swelling.      Cervical back: Neck supple.     Skin:     General: Skin is warm and dry.      Capillary Refill: Capillary refill takes less than 2 seconds.     Neurological:      Mental Status: She is alert.     Psychiatric:         Mood and Affect: Mood normal.         Results Reviewed       Procedure Component Value Units Date/Time    Blood culture #2 [[id number]] Collected: 06/18/25 1945    Lab Status: In process Specimen: Blood from Arm, Right Updated: 06/18/25 1949    Blood culture #1 [[id number]] Collected: 06/18/25 1940    Lab Status: In process Specimen: Blood from Arm, Right Updated: 06/18/25 1948    HS Troponin I 2hr [[id number]]  (Normal) Collected: 06/18/25 1853    Lab Status: Final result Specimen: Blood from Arm, Right Updated: 06/18/25 1930     hs TnI 2hr 16 ng/L      Delta 2hr hsTnI 0 ng/L     HS Troponin I 4hr [[id number]]     Lab Status: No result Specimen: Blood     Procalcitonin [[id number]]   (Abnormal) Collected: 06/18/25 1647    Lab Status: Final result Specimen: Blood from Arm, Right Updated: 06/18/25 1734     Procalcitonin 0.53 ng/ml     HS Troponin 0hr (reflex protocol) [[id number]]  (Normal) Collected: 06/18/25 1647    Lab Status: Final result Specimen: Blood from Arm, Right Updated: 06/18/25 1734     hs TnI 0hr 16 ng/L     B-Type Natriuretic Peptide(BNP) [[id number]]  (Abnormal) Collected: 06/18/25 1647    Lab Status: Final result Specimen: Blood from Arm, Right Updated: 06/18/25 1729      pg/mL     Comprehensive metabolic panel [[id number]]  (Abnormal) Collected: 06/18/25 1647    Lab Status: Final result Specimen: Blood from Arm, Right Updated: 06/18/25 1728     Sodium 132 mmol/L      Potassium 3.9 mmol/L      Chloride 94 mmol/L      CO2 29 mmol/L      ANION GAP 9 mmol/L      BUN 24 mg/dL      Creatinine 1.13 mg/dL      Glucose 109 mg/dL      Calcium 9.1 mg/dL      AST 29 U/L      ALT 26 U/L      Alkaline Phosphatase 58 U/L      Total Protein 7.3 g/dL      Albumin 4.1 g/dL      Total Bilirubin 1.09 mg/dL      eGFR 42 ml/min/1.73sq m     Narrative:      [employer] Kid[employer] guidelines for Chronic Kidney Disease (CKD):     Stage 1 with normal or high GFR (GFR > 90 mL/min/1.73 square meters)    Stage 2 Mild CKD (GFR = 60-89 mL/min/1.73 square meters)    Stage 3A Moderate CKD (GFR = 45-59 mL/min/1.73 square meters)    Stage 3B Moderate CKD (GFR = 30-44 mL/min/1.73 square meters)    Stage 4 Severe CKD (GFR = 15-29 mL/min/1.73 square meters)    Stage 5 End Stage CKD (GFR <15 mL/min/1.73 square meters)  Note: GFR calculation is accurate only with a steady state creatinine    Magnesium [[id number]]  (Abnormal) Collected: 06/18/25 1647    Lab Status: Final result Specimen: Blood from Arm, Right Updated: 06/18/25 1728     Magnesium 1.8 mg/dL     Lactic acid [[id number]]  (Normal) Collected: 06/18/25 1647    Lab Status: Final result Specimen: Blood from Arm, Right Updated: 06/18/25 3733      LACTIC ACID 1.1 mmol/L     Narrative:      Result may be elevated if tourniquet was used during collection.    Protime-INR [[id number]]  (Normal) Collected: 06/18/25 1647    Lab Status: Final result Specimen: Blood from Arm, Right Updated: 06/18/25 1720     Protime 13.5 seconds      INR 0.99    Narrative:      INR Therapeutic Range    Indication                                             INR Range      Atrial Fibrillation                                               2.0-3.0  Hypercoagulable State                                    2.0.2.3  Left Ventricular Asist Device                            2.0-3.0  Mechanical Heart Valve                                  -    Aortic(with afib, MI, embolism, HF, LA enlargement,    and/or coagulopathy)                                     2.0-3.0 (2.5-3.5)     Mitral                                                             2.5-3.5  Prosthetic/Bioprosthetic Heart Valve               2.0-3.0  Venous thromboembolism (VTE: VT, PE        2.0-3.0    APTT [[id number]]  (Normal) Collected: 06/18/25 1647    Lab Status: Final result Specimen: Blood from Arm, Right Updated: 06/18/25 1720     PTT 29 seconds     FLU/COVID Rapid Antigen (30 min. TAT) - Preferred screening test in ED [[id number]]  (Normal) Collected: 06/18/25 1647    Lab Status: Final result Specimen: Nares from Nose Updated: 06/18/25 1717     SARS COV Rapid Antigen Negative     Influenza A Rapid Antigen Negative     Influenza B Rapid Antigen Negative    Narrative:      This test has been performed using the [employer] Alysia 2 FLU+SARS Antigen test under the Emergency Use Authorization (EUA). This test has been validated by the  and verified by the performing laboratory. The Alysia uses lateral flow immunofluorescent sandwich assay to detect SARS-COV, Influenza A and Influenza B Antigen.     The [employer] Alysia 2 SARS Antigen test does not differentiate between SARS-CoV and SARS-CoV-2.     Negative results are presumptive  and may be confirmed with a molecular assay, if necessary, for patient management. Negative results do not rule out SARS-CoV-2 or influenza infection and should not be used as the sole basis for treatment or patient management decisions. A negative test result may occur if the level of antigen in a sample is below the limit of detection of this test.     Positive results are indicative of the presence of viral antigens, but do not rule out bacterial infection or co-infection with other viruses.     All test results should be used as an adjunct to clinical observations and other information available to the provider.    FOR PEDIATRIC PATIENTS - copy/paste COVID Guidelines URL to browser: [email or web address]    Blood culture #1 [[id number]] Collected: 06/18/25 1704    Lab Status: In process Specimen: Blood from Arm, Left Updated: 06/18/25 1706    Blood culture #2 [[id number]] Collected: 06/18/25 1647    Lab Status: In process Specimen: Blood from Arm, Right Updated: 06/18/25 1703    CBC and differential [[id number]]  (Abnormal) Collected: 06/18/25 1647    Lab Status: Final result Specimen: Blood from Arm, Right Updated: 06/18/25 1701     WBC 9.18 Thousand/uL      RBC 3.43 Million/uL      Hemoglobin 11.4 g/dL      Hematocrit 34.1 %      MCV 99 fL      MCH 33.2 pg      MCHC 33.4 g/dL      RDW 13.3 %      MPV 8.5 fL      Platelets 204 Thousands/uL      nRBC 0 /100 WBCs      Segmented % 81 %      Immature Grans % 0 %      Lymphocytes % 11 %      Monocytes % 7 %      Eosinophils Relative 1 %      Basophils Relative 0 %      Absolute Neutrophils 7.48 Thousands/µL      Absolute Immature Grans 0.03 Thousand/uL      Absolute Lymphocytes 0.97 Thousands/µL      Absolute Monocytes 0.62 Thousand/µL      Eosinophils Absolute 0.05 Thousand/µL      Basophils Absolute 0.03 Thousands/µL     Urinalysis with microscopic [[id number]]     Lab Status: No result Specimen: Urine              CT pe study w abdomen pelvis w contrast   Final Interpretation by Anila Cedeno MD (06/18 1916)      Negative for acute pulmonary thromboembolism.      Multifocal pneumonia in the right lung.      No acute findings in the abdomen or pelvis.      The study was marked in EPIC for immediate notification.                  Computerized Assisted Algorithm (CAA) may have aided analysis of applicable images.      Workstation performed: [id number]             Procedures    ED Medication and Procedure Management   Prior to Admission Medications   Prescriptions Last Dose Informant Patient Reported? Taking?   Cholecalciferol 50 MCG (2000 UT) CAPS   Yes No   Sig: Take 1 tablet by mouth Daily   Multiple Vitamins-Minerals (OCUVITE-LUTEIN PO)   Yes No   Sig: Take 1 tablet by mouth daily   dextromethorphan-guaifenesin (MUCINEX DM)  MG per 12 hr tablet   No No   Sig: Take 1 tablet by mouth every 12 (twelve) hours   losartan-hydrochlorothiazide (HYZAAR) 100-12.5 MG per tablet   Yes No   Sig: Take 1 tablet by mouth daily   ondansetron (ZOFRAN) 4 mg tablet   No No   Sig: Take 1 tablet (4 mg total) by mouth every 6 (six) hours as needed for nausea or vomiting   oxyCODONE (ROXICODONE) 5 mg immediate release tablet   No No   Sig: Take 1 tablet (5 mg total) by mouth every 6 (six) hours as needed for moderate pain for up to 8 dosesMax Daily Amount: 20 mg   promethazine-codeine (PHENERGAN WITH CODEINE) 6.25-10 mg/5 mL syrup   Yes No   propranolol (INDERAL LA) 60 mg 24 hr capsule   Yes No   Sig: Take 120 mg by mouth daily   rizatriptan (MAXALT-MLT) 5 MG disintegrating tablet   Yes No      Facility-Administered Medications: None     Patient's Medications   Discharge Prescriptions    No medications on file     No discharge procedures on file.  ED SEPSIS DOCUMENTATION   Time reflects when diagnosis was documented in both MDM as applicable and the Disposition within this note       Time User Action Codes Description Comment     6/18/2025  8:04 PM Benson Castellanos Add [J18.9] Pneumonia                    [1]   Past Medical History:  Diagnosis Date    Hypertension     Sciatica    [2]   Past Surgical History:  Procedure Laterality Date    APPENDECTOMY     [3] No family history on file.  [4]   Social History  Tobacco Use    Smoking status: Never    Smokeless tobacco: Never   Vaping Use    Vaping status: Never Used   Substance Use Topics    Alcohol use: Never    Drug use: Never        Benson Castellanos DO  06/18/25 2007

## 2025-06-19 NOTE — ASSESSMENT & PLAN NOTE
PTA losartan 100 mg/HCTZ, Lasix 40 mg daily  Continue losartan for now, will hold diuretics as appeared hypovolemic on admission received small fluid bolus and was hyponatremic with sodium of 132  Takes propranolol for migraines  Trend blood pressures

## 2025-06-19 NOTE — H&P
H&P - Hospitalist   Name: Yesica Kulkarni 89 y.o. female I MRN: [id number]  Unit/Bed#: -01 I Date of Admission: 6/18/2025   Date of Service: 6/19/2025 I Hospital Day: 1     Assessment & Plan  Multifocal pneumonia  Presented with cough, congestion, dyspnea, chills over the past few days.  States began on Sunday when she was [place] and felt very [place].  COVID/flu/RSV negative  CT chest with multifocal pneumonia  Empiric ceftriaxone  No SIRS criteria  Check urine antigens and sputum culture  Endorses occasional dysphagia, appreciate speech therapy evaluation  Trend fever curve, leukocytosis  Essential hypertension  PTA losartan 100 mg/HCTZ, Lasix 40 mg daily  Continue losartan for now, will hold diuretics as appeared hypovolemic on admission received small fluid bolus and was hyponatremic with sodium of 132  Takes propranolol for migraines  Trend blood pressures  Hyponatremia  Sodium 132 on admission  Hypovolemic  HCTZ and furosemide on hold  Trend sodium      VTE Pharmacologic Prophylaxis:   High Risk (Score >/= 5) - Pharmacological DVT Prophylaxis Ordered: enoxaparin (Lovenox). Sequential Compression Devices Ordered.  Code Status: Level 1 - Full Code   Discussion with family: Patient declined call to .     Anticipated Length of Stay: Patient will be admitted on an inpatient basis with an anticipated length of stay of greater than 2 midnights secondary to pneumonia.    History of Present Illness   Chief Complaint: Cough, dyspnea    Yesica Kulkarni is a 89 y.o. female with a PMH of hypertension, migraines who presents with cough congestion and chills over the past 2 to 3 days.  Patient is independent with all ADLs, very pleasant female who is hard of hearing and wears hearing aids presents to the ER with cough, congestion, chills and unfortunately was found to have multifocal pneumonia on chest CT.  COVID/flu/RSV negative.  States she did start to feel chills while she was sitting in [employer] on  Sunday and may have been exposed to ill persons but also endorses infrequent episodes of dysphagia.  Initiated on antibiotic therapy and presented to the medical service for further evaluation and treatment    Review of Systems   Constitutional:  Positive for chills. Negative for fever.   HENT:  Positive for congestion. Negative for ear pain and sore throat.    Eyes:  Negative for pain and visual disturbance.   Respiratory:  Positive for cough, shortness of breath and wheezing.    Cardiovascular:  Negative for chest pain and palpitations.   Gastrointestinal:  Negative for abdominal pain and vomiting.   Genitourinary:  Negative for dysuria and hematuria.   Musculoskeletal:  Positive for myalgias. Negative for arthralgias and back pain.   Skin:  Negative for color change and rash.   Neurological:  Negative for seizures and syncope.   All other systems reviewed and are negative.      Historical Information   Past Medical History[1]  Past Surgical History[2]  Social History[3]  E-Cigarette/Vaping    E-Cigarette Use Never User      E-Cigarette/Vaping Substances     Family History[4]  Social History:  Marital Status:    Occupation: Retired  Patient Pre-hospital Living Situation: Home  Patient Pre-hospital Level of Mobility: walks  Patient Pre-hospital Diet Restrictions: None    Meds/Allergies   I have reviewed home medications with patient personally.  Prior to Admission medications    Medication Sig Start Date End Date Taking? Authorizing Provider   Cholecalciferol 50 MCG (2000 UT) CAPS Take 1 tablet by mouth in the morning.   Yes Historical Provider, MD   furosemide (LASIX) 40 mg tablet Take 1 tablet by mouth in the morning 5/31/25  Yes Historical Provider, MD   losartan-hydrochlorothiazide (HYZAAR) 100-12.5 MG per tablet Take 1 tablet by mouth in the morning. 8/16/19  Yes Historical Provider, MD   Multiple Vitamins-Minerals (OCUVITE-LUTEIN PO) Take 1 tablet by mouth in the morning.   Yes Historical Provider, MD    propranolol (INDERAL LA) 60 mg 24 hr capsule Take 120 mg by mouth in the morning.   Yes Historical Provider, MD   rizatriptan (MAXALT-MLT) 5 MG disintegrating tablet    Yes Historical Provider, MD   dextromethorphan-guaifenesin (MUCINEX DM)  MG per 12 hr tablet Take 1 tablet by mouth every 12 (twelve) hours  Patient not taking: Reported on 6/18/2025 2/13/22   ROCÍO Callaway   ondansetron (ZOFRAN) 4 mg tablet Take 1 tablet (4 mg total) by mouth every 6 (six) hours as needed for nausea or vomiting  Patient not taking: Reported on 6/18/2025 5/20/20   Jaron Jarvis DO   oxyCODONE (ROXICODONE) 5 mg immediate release tablet Take 1 tablet (5 mg total) by mouth every 6 (six) hours as needed for moderate pain for up to 8 dosesMax Daily Amount: 20 mg  Patient not taking: Reported on 6/18/2025 5/20/20   Jaron Jarvis DO   promethazine-codeine (PHENERGAN WITH CODEINE) 6.25-10 mg/5 mL syrup     Historical Provider, MD     Allergies   Allergen Reactions    Clarithromycin Dizziness     States her ears buzzed and fuzzy in the head     Clindamycin     Levofloxacin Dizziness       Objective :  Temp:  [97 °F (36.1 °C)-98.9 °F (37.2 °C)] 97 °F (36.1 °C)  HR:  [72-98] 73  BP: ()/(44-83) 135/83  Resp:  [16-20] 18  SpO2:  [90 %-97 %] 95 %  O2 Device: Nasal cannula  Nasal Cannula O2 Flow Rate (L/min):  [2 L/min] 2 L/min    Physical Exam  Vitals and nursing note reviewed.   Constitutional:       General: She is not in acute distress.     Appearance: She is well-developed.   HENT:      Head: Normocephalic and atraumatic.      Mouth/Throat:      Mouth: Mucous membranes are dry.     Eyes:      Conjunctiva/sclera: Conjunctivae normal.       Cardiovascular:      Rate and Rhythm: Normal rate and regular rhythm.      Heart sounds: No murmur heard.  Pulmonary:      Effort: Pulmonary effort is normal. No respiratory distress.      Breath sounds: Wheezing and rhonchi present.   Abdominal:      Palpations: Abdomen is soft.  "     Tenderness: There is no abdominal tenderness.     Musculoskeletal:         General: No swelling.      Cervical back: Neck supple.     Skin:     General: Skin is warm and dry.      Capillary Refill: Capillary refill takes less than 2 seconds.     Neurological:      General: No focal deficit present.      Mental Status: She is alert and oriented to person, place, and time.     Psychiatric:         Mood and Affect: Mood normal.         Behavior: Behavior normal.         Lab Results: I have reviewed the following results:  Results from last 7 days   Lab Units 06/18/25  1647   WBC Thousand/uL 9.18   HEMOGLOBIN g/dL 11.4*   HEMATOCRIT % 34.1*   PLATELETS Thousands/uL 204   SEGS PCT % 81*   LYMPHO PCT % 11*   MONO PCT % 7   EOS PCT % 1     Results from last 7 days   Lab Units 06/18/25  1647   SODIUM mmol/L 132*   POTASSIUM mmol/L 3.9   CHLORIDE mmol/L 94*   CO2 mmol/L 29   BUN mg/dL 24   CREATININE mg/dL 1.13   ANION GAP mmol/L 9   CALCIUM mg/dL 9.1   ALBUMIN g/dL 4.1   TOTAL BILIRUBIN mg/dL 1.09*   ALK PHOS U/L 58   ALT U/L 26   AST U/L 29   GLUCOSE RANDOM mg/dL 109     Results from last 7 days   Lab Units 06/18/25  1647   INR  0.99         No results found for: \"HGBA1C\"  Results from last 7 days   Lab Units 06/19/25  0436 06/18/25  1647   LACTIC ACID mmol/L  --  1.1   PROCALCITONIN ng/ml 0.63* 0.53*       Imaging Results Review: I reviewed radiology reports from this admission including: CT chest and CT abdomen/pelvis.  Other Study Results Review: EKG was reviewed.       ** Please Note: This note has been constructed using a voice recognition system. **         [1]   Past Medical History:  Diagnosis Date    Hypertension     Sciatica    [2]   Past Surgical History:  Procedure Laterality Date    APPENDECTOMY     [3]   Social History  Tobacco Use    Smoking status: Never    Smokeless tobacco: Never   Vaping Use    Vaping status: Never Used   Substance and Sexual Activity    Alcohol use: Never    Drug use: Never   [4] " No family history on file.

## 2025-06-19 NOTE — ASSESSMENT & PLAN NOTE
PTA losartan 100 mg/HCTZ, Lasix 40 mg daily  Continue losartan for now, will hold diuretics as appeared hypovolemic on admission received small fluid bolus and was hyponatremic with sodium of 132  Takes propranolol for migraines  Trend blood pressures  Blood pressure is low normal.  Will decrease losartan to 50 mg daily for now and see how blood pressures trend

## 2025-06-19 NOTE — SPEECH THERAPY NOTE
Speech Language/Pathology  Consult received.  Records reviewed.  Pt admitted c symptoms concerning for multifocal pneumonia.  Pt passed RN Dysphagia Assessment.  Currently on room air. Reported feeling improved since admission. Upon ST arrival pt eating sandwich, she denied difficulties with chewing and swallowing. Reported sometimes especially bread will feel stuck in her chest. Stated she will take a drink or wait 2 minutes and sensation then resolves. Reported reflux with soda. ST reviewed safe swallow strategies including slow rate, alternate liquids with solids, and swallow prior to additional po, pt understood. Also reviewed GERD precautions including Eat slowly. Chew well. Elevate head of bed 30 degrees/6 inches at rest. (wedge cushion, risers, pillows, Upright during and after meals ~ 1 hour.. Limit/avoid acidic foods such as orange/grapefruit juice, caffeine, alcohol, chocolate, mint, fried foods, spicy foods, etc ,Smaller, more frequent meals as needed. Add extra moisture (condiments/sauce/gravy/dunk) to foods Avoid dry/dense foods. Pt expressed decreased intake since her  passed. Stated she will usually eat snacks throughout the day rather than a large meal. Spoke to nursing reported no difficulties with breakfast/lunch or with medications.  No additional inpatient Speech Pathology evaluation appears indicated at this time.  Please re-consult if additional concerns arise. Thank you.

## 2025-06-19 NOTE — ASSESSMENT & PLAN NOTE
Presented with cough, congestion, dyspnea, chills over the past few days.  States began on Sunday when she was in [employer] and felt very cold.  COVID/flu/RSV negative  CT chest with multifocal pneumonia  Empiric ceftriaxone  No SIRS criteria  Check urine antigens and sputum culture  Endorses occasional dysphagia, appreciate speech therapy evaluation  Trend fever curve, leukocytosis

## 2025-06-19 NOTE — PROGRESS NOTES
Progress Note - Hospitalist   Name: Yesica Kulkarni 89 y.o. female I MRN: [id number]  Unit/Bed#: -01 I Date of Admission: 6/18/2025   Date of Service: 6/19/2025 I Hospital Day: 1    Assessment & Plan  Multifocal pneumonia  Presented with cough, congestion, dyspnea, chills over the past few days.  States began on Sunday when she was in [employer] and felt very cold.  COVID/flu/RSV negative  CT chest with multifocal pneumonia  Empiric ceftriaxone and add doxycycline  No SIRS criteria  legionella negative.  Strep pneumo positive. no sputum culture so far  Endorses occasional dysphagia, appreciate speech therapy evaluation  Trend fever curve, leukocytosis  Try to wean off oxygen.  Essential hypertension  PTA losartan 100 mg/HCTZ, Lasix 40 mg daily  Continue losartan for now, will hold diuretics as appeared hypovolemic on admission received small fluid bolus and was hyponatremic with sodium of 132  Takes propranolol for migraines  Trend blood pressures  Blood pressure is low normal.  Will decrease losartan to 50 mg daily for now and see how blood pressures trend  Hyponatremia  Sodium 132 on admission  Hypovolemic  HCTZ and furosemide on hold  Trend sodium    VTE Pharmacologic Prophylaxis:   Moderate Risk (Score 3-4) - Pharmacological DVT Prophylaxis Ordered: enoxaparin (Lovenox).    Mobility:   Basic Mobility Inpatient Raw Score: 24  JH-HLM Goal: 8: Walk 250 feet or more  JH-HLM Achieved: 7: Walk 25 feet or more  JH-HLM Goal achieved. Continue to encourage appropriate mobility.    Patient Centered Rounds: I performed bedside rounds with nursing staff today.   Discussions with Specialists or Other Care Team Provider: none    Education and Discussions with Family / Patient: Updated  (daughter) via phone.    Current Length of Stay: 1 day(s)  Current Patient Status: Inpatient   Certification Statement: The patient will continue to require additional inpatient hospital stay due to pneumonia  Discharge Plan:  Anticipate discharge tomorrow to home.    Code Status: Level 1 - Full Code    Subjective   Patient states she is feeling a little bit better today her shortness of breath is slowly improving currently requiring 1 L of oxygen at rest.  She had a good appetite today and ate a big breakfast    Objective :  Temp:  [97 °F (36.1 °C)-98.9 °F (37.2 °C)] 97.5 °F (36.4 °C)  HR:  [72-98] 73  BP: ()/(44-83) 117/78  Resp:  [16-20] 18  SpO2:  [90 %-97 %] 96 %  O2 Device: None (Room air)  Nasal Cannula O2 Flow Rate (L/min):  [2 L/min] 2 L/min    Body mass index is 23.16 kg/m².     Input and Output Summary (last 24 hours):     Intake/Output Summary (Last 24 hours) at 6/19/2025 1053  Last data filed at 6/19/2025 0912  Gross per 24 hour   Intake 690 ml   Output 1200 ml   Net -510 ml       Physical Exam  Vitals and nursing note reviewed.   Constitutional:       Appearance: Normal appearance.   HENT:      Head: Normocephalic and atraumatic.      Right Ear: External ear normal.      Left Ear: External ear normal.      Nose: Nose normal.      Mouth/Throat:      Pharynx: Oropharynx is clear.     Eyes:      Pupils: Pupils are equal, round, and reactive to light.       Cardiovascular:      Rate and Rhythm: Normal rate and regular rhythm.      Heart sounds: Normal heart sounds.   Pulmonary:      Effort: Pulmonary effort is normal.      Breath sounds: Rhonchi present.   Abdominal:      General: Bowel sounds are normal.      Palpations: Abdomen is soft.      Tenderness: There is no abdominal tenderness.     Musculoskeletal:         General: Normal range of motion.      Cervical back: Normal range of motion and neck supple.     Skin:     General: Skin is warm and dry.      Capillary Refill: Capillary refill takes less than 2 seconds.     Neurological:      General: No focal deficit present.      Mental Status: She is alert and oriented to person, place, and time.     Psychiatric:         Mood and Affect: Mood normal.            Lines/Drains:              Lab Results: I have reviewed the following results:   Results from last 7 days   Lab Units 06/18/25  1647   WBC Thousand/uL 9.18   HEMOGLOBIN g/dL 11.4*   HEMATOCRIT % 34.1*   PLATELETS Thousands/uL 204   SEGS PCT % 81*   LYMPHO PCT % 11*   MONO PCT % 7   EOS PCT % 1     Results from last 7 days   Lab Units 06/18/25  1647   SODIUM mmol/L 132*   POTASSIUM mmol/L 3.9   CHLORIDE mmol/L 94*   CO2 mmol/L 29   BUN mg/dL 24   CREATININE mg/dL 1.13   ANION GAP mmol/L 9   CALCIUM mg/dL 9.1   ALBUMIN g/dL 4.1   TOTAL BILIRUBIN mg/dL 1.09*   ALK PHOS U/L 58   ALT U/L 26   AST U/L 29   GLUCOSE RANDOM mg/dL 109     Results from last 7 days   Lab Units 06/18/25  1647   INR  0.99             Results from last 7 days   Lab Units 06/19/25  0436 06/18/25  1647   LACTIC ACID mmol/L  --  1.1   PROCALCITONIN ng/ml 0.63* 0.53*       Recent Cultures (last 7 days):   Results from last 7 days   Lab Units 06/19/25  0441 06/18/25  1704 06/18/25  1647   BLOOD CULTURE   --  Received in Microbiology Lab. Culture in Progress. Received in Microbiology Lab. Culture in Progress.   LEGIONELLA URINARY ANTIGEN  Negative  --   --        Imaging Results Review: I reviewed radiology reports from this admission including: CT chest.  Other Study Results Review: EKG was reviewed.     Last 24 Hours Medication List:     Current Facility-Administered Medications:     acetaminophen (TYLENOL) tablet 650 mg, Q6H PRN    cefTRIAXone (ROCEPHIN) IVPB (premix in dextrose) 1,000 mg 50 mL, Q24H, Last Rate: 1,000 mg (06/19/25 0912)    Cholecalciferol (VITAMIN D3) tablet 1,000 Units, Daily    doxycycline hyclate (VIBRAMYCIN) capsule 100 mg, Q12H CORBY    enoxaparin (LOVENOX) subcutaneous injection 30 mg, Daily    guaiFENesin (MUCINEX) 12 hr tablet 600 mg, BID    [START ON 6/20/2025] losartan (COZAAR) tablet 50 mg, Daily    propranolol (INDERAL LA) 24 hr capsule 120 mg, Daily    Administrative Statements   Today, Patient Was Seen By: Alis  MD Rashard      **Please Note: This note may have been constructed using a voice recognition system.**

## 2025-06-19 NOTE — UTILIZATION REVIEW
Initial Clinical Review    Admission: Date/Time/Statement:   Admission Orders (From admission, onward)       Ordered        06/18/25 2005  INPATIENT ADMISSION  Once                          Orders Placed This Encounter   Procedures    INPATIENT ADMISSION     Standing Status:   Standing     Number of Occurrences:   1     Level of Care:   Med Surg [16]     Estimated length of stay:   More than 2 Midnights     Certification:   I certify that inpatient services are medically necessary for this patient for a duration of greater than two midnights. See H&P and MD Progress Notes for additional information about the patient's course of treatment.     ED Arrival Information       Expected   -    Arrival   6/18/2025 16:22    Acuity   Emergent              Means of arrival   Walk-In    Escorted by   Family Member    Service   Hospitalist    Admission type   Emergency              Arrival complaint   Cough             Chief Complaint   Patient presents with    Cough     Pt states they started with a cough, congestion, and chills on Sunday - SOB with exertion        Initial Presentation: 89 y.o. female to ED via walk-in from home  Present to ED with cough, congestion, chills and unfortunately was found to have multifocal pneumonia on chest CT. endorses infrequent episodes of dysphagia. Patient is independent with all ADLs   PMHX hypertension, migraines   Admitted to [facility] with DX: Multifocal pneumonia   on exam: lungs with wheezing and rhonchi; Na 132; procal 0.53   CT Multifocal pneumonia in the right lung.   PLAN: iv abx; monitor labs; f/u urine antigens and sputum culture; consult Speech; hold HCTZ and furosemide       Anticipated Length of Stay/Certification Statement: Patient will be admitted on an inpatient basis with an anticipated length of stay of greater than 2 midnights secondary to pneumonia.       Date: 6/19/25      Day 2:   Patient states she is feeling a little bit better today her shortness of breath is slowly  improving. Blood pressure is low normal. Will decrease losartan to 50 mg daily for now and see how blood pressures trend O2 @ 2L via nc. Strep pneumo positive. legionella negative. Lungs with rhonchi; Procal 0.63  Plan: iv abx; monitor labs; f/u sputum culture; consult Speech; hold HCTZ and furosemide; titrate O2; trend BP's       Date: 6/20/25     Day 3: Has surpassed a 2nd midnight with active treatments and services.        ED Treatment-Medication Administration from 06/18/2025 1621 to 06/18/2025 2054         Date/Time Order Dose Route Action     06/18/2025 1652 ipratropium-albuterol (DUO-NEB) 0.5-2.5 mg/3 mL inhalation solution 3 mL 3 mL Nebulization Given     06/18/2025 1649 benzonatate (TESSALON PERLES) capsule 100 mg 100 mg Oral Given     06/18/2025 1651 methylPREDNISolone sodium succinate (Solu-MEDROL) injection 125 mg 125 mg Intravenous Given     06/18/2025 1747 iohexol (OMNIPAQUE) 350 MG/ML injection (MULTI-DOSE) 75 mL 75 mL Intravenous Given     06/18/2025 2009 vancomycin (VANCOCIN) IVPB (premix in dextrose) 1,000 mg 200 mL 1,000 mg Intravenous New Bag     06/18/2025 1949 piperacillin-tazobactam (ZOSYN) IVPB 4.5 g 4.5 g Intravenous New Bag     06/18/2025 2013 sodium chloride 0.9 % bolus 500 mL 500 mL Intravenous New Bag            Scheduled Medications:  cefTRIAXone, 1,000 mg, Intravenous, Q24H  Cholecalciferol, 1,000 Units, Oral, Daily  doxycycline hyclate, 100 mg, Oral, Q12H CORBY  enoxaparin, 30 mg, Subcutaneous, Daily  guaiFENesin, 600 mg, Oral, BID  [START ON 6/20/2025] losartan, 50 mg, Oral, Daily  propranolol, 120 mg, Oral, Daily      Continuous IV Infusions: None       PRN Meds:  acetaminophen, 650 mg, Oral, Q6H PRN      ED Triage Vitals   Temperature Pulse Respirations Blood Pressure SpO2 Pain Score   06/18/25 1636 06/18/25 1636 06/18/25 1636 06/18/25 1636 06/18/25 1636 06/18/25 1945   98.9 °F (37.2 °C) 72 18 125/69 97 % No Pain     Weight (last 2 days)       Date/Time Weight    06/18/25 21:05:37  57.4 (126.6)    06/18/25 1636 59 (130.07)            Vital Signs (last 3 days)       Date/Time Temp Pulse Resp BP MAP (mmHg) SpO2 Calculated FIO2 (%) - Nasal Cannula Nasal Cannula O2 Flow Rate (L/min) O2 Device Patient Position - Orthostatic VS Pain    06/19/25 0900 -- -- -- -- -- 96 % -- -- -- -- --    06/19/25 0706 97.5 °F (36.4 °C) 73 18 117/78 86 96 % -- -- None (Room air) Sitting No Pain    06/19/25 0453 97 °F (36.1 °C) 73 18 135/83 95 -- -- -- -- Lying --    06/18/25 2123 -- -- -- 100/68 -- -- 28 2 L/min Nasal cannula -- --    06/18/25 21:05:37 -- 88 -- 93/44 60 95 % -- -- -- -- --    06/18/25 2100 -- -- -- -- -- 93 % -- -- None (Room air) -- No Pain    06/18/25 1945 -- 89 16 108/59 79 93 % -- -- None (Room air) Lying No Pain    06/18/25 1915 -- 91 18 99/53 74 92 % -- -- None (Room air) Lying --    06/18/25 1845 -- 91 18 105/50 67 90 % -- -- None (Room air) Lying --    06/18/25 1830 -- 91 18 99/57 77 92 % -- -- None (Room air) Lying --    06/18/25 1815 -- 90 18 109/54 78 91 % -- -- None (Room air) Lying --    06/18/25 1800 -- 91 18 111/59 82 92 % -- -- None (Room air) Lying --    06/18/25 1730 -- 88 18 122/62 87 94 % -- -- None (Room air) Lying --    06/18/25 1715 -- 95 18 118/71 89 93 % -- -- None (Room air) Lying --    06/18/25 1700 -- 91 20 127/58 84 94 % -- -- None (Room air) Lying --    06/18/25 1645 -- 98 18 125/69 92 94 % -- -- None (Room air) Lying --    06/18/25 1636 98.9 °F (37.2 °C) 72 18 125/69 -- 97 % -- -- -- -- --              Pertinent Labs/Diagnostic Test Results:   Radiology:  CT pe study w abdomen pelvis w contrast   Final Interpretation by Anila Cedeno MD (06/18 1916)      Negative for acute pulmonary thromboembolism.      Multifocal pneumonia in the right lung.      No acute findings in the abdomen or pelvis.      The study was marked in EPIC for immediate notification.                  Computerized Assisted Algorithm (CAA) may have aided analysis of applicable images.       Workstation performed: [id number]           Cardiology:  ECG 12 lead   Final Result by Delmar Moseely MD (06/18 1722)   Normal sinus rhythm   Minimal voltage criteria for LVH, may be normal variant ( [place] product    )   Inferior infarct , age undetermined   Abnormal ECG   No previous ECGs available   Confirmed by Delmar Moseley ([id number]) on 6/18/2025 5:22:10 PM           Results from last 7 days   Lab Units 06/18/25  1647   WBC Thousand/uL 9.18   HEMOGLOBIN g/dL 11.4*   HEMATOCRIT % 34.1*   PLATELETS Thousands/uL 204   TOTAL NEUT ABS Thousands/µL 7.48         Results from last 7 days   Lab Units 06/18/25  1647   SODIUM mmol/L 132*   POTASSIUM mmol/L 3.9   CHLORIDE mmol/L 94*   CO2 mmol/L 29   ANION GAP mmol/L 9   BUN mg/dL 24   CREATININE mg/dL 1.13   EGFR ml/min/1.73sq m 42   CALCIUM mg/dL 9.1   MAGNESIUM mg/dL 1.8*     Results from last 7 days   Lab Units 06/18/25  1647   AST U/L 29   ALT U/L 26   ALK PHOS U/L 58   TOTAL PROTEIN g/dL 7.3   ALBUMIN g/dL 4.1   TOTAL BILIRUBIN mg/dL 1.09*         Results from last 7 days   Lab Units 06/18/25  1647   GLUCOSE RANDOM mg/dL 109        Results from last 7 days   Lab Units 06/18/25  1853 06/18/25  1647   HS TNI 0HR ng/L  --  16   HS TNI 2HR ng/L 16  --    HSTNI D2 ng/L 0  --          Results from last 7 days   Lab Units 06/18/25  1647   PROTIME seconds 13.5   INR  0.99   PTT seconds 29         Results from last 7 days   Lab Units 06/19/25  0436 06/18/25  1647   PROCALCITONIN ng/ml 0.63* 0.53*     Results from last 7 days   Lab Units 06/18/25  1647   LACTIC ACID mmol/L 1.1        Results from last 7 days   Lab Units 06/18/25  1647   BNP pg/mL 302*        Results from last 7 days   Lab Units 06/19/25  0441   CLARITY UA  Clear   COLOR UA  Wilma   SPEC GRAV UA  1.010   PH UA  6.0   GLUCOSE UA mg/dl Negative   KETONES UA mg/dl Negative   BLOOD UA  Trace-Intact*   PROTEIN UA mg/dl 30 (1+)*   NITRITE UA  Negative   BILIRUBIN UA  Negative   UROBILINOGEN UA E.U./dl 0.2    LEUKOCYTES UA  Negative   WBC UA /hpf 2-4   RBC UA /hpf 0-1*   BACTERIA UA /hpf Occasional   EPITHELIAL CELLS WET PREP /hpf Occasional     Results from last 7 days   Lab Units 06/19/25  0441   STREP PNEUMONIAE ANTIGEN, URINE  Positive*   LEGIONELLA URINARY ANTIGEN  Negative        Results from last 7 days   Lab Units 06/18/25  1945 06/18/25  1940 06/18/25  1704 06/18/25  1647   BLOOD CULTURE  Received in Microbiology Lab. Culture in Progress. Received in Microbiology Lab. Culture in Progress. Received in Microbiology Lab. Culture in Progress. Received in Microbiology Lab. Culture in Progress.          Admitting Diagnosis: Cough [R05.9]  Pneumonia [J18.9]  Age/Sex: 89 y.o. female    Network Utilization Review Department  ATTENTION: Please call with any questions or concerns to [phone] and carefully listen to the prompts so that you are directed to the right person. All voicemails are confidential.   For Discharge needs, contact Care Management DC Support Team at [phone] opt. 2  Send all requests for admission clinical reviews, approved or denied determinations and any other requests to dedicated fax number below belonging to the campus where the patient is receiving treatment. List of dedicated fax numbers for the Facilities:  FACILITY NAME UR FAX NUMBER   ADMISSION DENIALS (Administrative/Medical Necessity) [phone]   DISCHARGE SUPPORT TEAM (NETWORK) [phone]   PARENT CHILD HEALTH (Maternity/NICU/Pediatrics) [phone]   [facility] [phone]   [facility] [phone]   [facility] [phone]   [facility] [phone]   [facility] [phone]   [facility] [phone]   [facility] [phone]   [facility] [phone]   [facility]  [phone]   [facility] [phone]   [facility] [phone]   [facility] [phone]

## 2025-06-19 NOTE — ASSESSMENT & PLAN NOTE
Presented with cough, congestion, dyspnea, chills over the past few days.  States began on Sunday when she was in [employer] and felt very cold.  COVID/flu/RSV negative  CT chest with multifocal pneumonia  Empiric ceftriaxone and add doxycycline  No SIRS criteria  legionella negative.  Strep pneumo positive. no sputum culture so far  Endorses occasional dysphagia, appreciate speech therapy evaluation  Trend fever curve, leukocytosis  Try to wean off oxygen.

## 2025-06-20 LAB
ANION GAP SERPL CALCULATED.3IONS-SCNC: 8 MMOL/L (ref 4–13)
BUN SERPL-MCNC: 29 MG/DL (ref 5–25)
CALCIUM SERPL-MCNC: 8.7 MG/DL (ref 8.4–10.2)
CHLORIDE SERPL-SCNC: 98 MMOL/L (ref 96–108)
CO2 SERPL-SCNC: 27 MMOL/L (ref 21–32)
CREAT SERPL-MCNC: 0.99 MG/DL (ref 0.6–1.3)
GFR SERPL CREATININE-BSD FRML MDRD: 50 ML/MIN/1.73SQ M
GLUCOSE SERPL-MCNC: 113 MG/DL (ref 65–140)
POTASSIUM SERPL-SCNC: 4 MMOL/L (ref 3.5–5.3)
SODIUM SERPL-SCNC: 133 MMOL/L (ref 135–147)

## 2025-06-20 PROCEDURE — 99232 SBSQ HOSP IP/OBS MODERATE 35: CPT | Performed by: FAMILY MEDICINE

## 2025-06-20 PROCEDURE — 94664 DEMO&/EVAL PT USE INHALER: CPT

## 2025-06-20 PROCEDURE — 80048 BASIC METABOLIC PNL TOTAL CA: CPT | Performed by: FAMILY MEDICINE

## 2025-06-20 PROCEDURE — 94760 N-INVAS EAR/PLS OXIMETRY 1: CPT

## 2025-06-20 PROCEDURE — 94640 AIRWAY INHALATION TREATMENT: CPT

## 2025-06-20 RX ORDER — LEVALBUTEROL INHALATION SOLUTION 0.63 MG/3ML
0.63 SOLUTION RESPIRATORY (INHALATION) EVERY 8 HOURS PRN
Status: DISCONTINUED | OUTPATIENT
Start: 2025-06-20 | End: 2025-06-20

## 2025-06-20 RX ORDER — LEVALBUTEROL INHALATION SOLUTION 0.63 MG/3ML
0.63 SOLUTION RESPIRATORY (INHALATION)
Status: DISCONTINUED | OUTPATIENT
Start: 2025-06-20 | End: 2025-06-20

## 2025-06-20 RX ORDER — LEVALBUTEROL INHALATION SOLUTION 1.25 MG/3ML
1.25 SOLUTION RESPIRATORY (INHALATION)
Status: DISCONTINUED | OUTPATIENT
Start: 2025-06-20 | End: 2025-06-21 | Stop reason: HOSPADM

## 2025-06-20 RX ORDER — BENZONATATE 100 MG/1
100 CAPSULE ORAL 3 TIMES DAILY PRN
Status: DISCONTINUED | OUTPATIENT
Start: 2025-06-20 | End: 2025-06-21 | Stop reason: HOSPADM

## 2025-06-20 RX ADMIN — LOSARTAN POTASSIUM 50 MG: 50 TABLET, FILM COATED ORAL at 08:35

## 2025-06-20 RX ADMIN — IPRATROPIUM BROMIDE 0.5 MG: 0.5 SOLUTION RESPIRATORY (INHALATION) at 14:22

## 2025-06-20 RX ADMIN — IPRATROPIUM BROMIDE 0.5 MG: 0.5 SOLUTION RESPIRATORY (INHALATION) at 20:30

## 2025-06-20 RX ADMIN — GUAIFENESIN 600 MG: 600 TABLET ORAL at 16:43

## 2025-06-20 RX ADMIN — GUAIFENESIN 600 MG: 600 TABLET ORAL at 08:35

## 2025-06-20 RX ADMIN — DOXYCYCLINE HYCLATE 100 MG: 100 CAPSULE ORAL at 21:53

## 2025-06-20 RX ADMIN — CEFTRIAXONE 1000 MG: 1 INJECTION, SOLUTION INTRAVENOUS at 08:36

## 2025-06-20 RX ADMIN — ENOXAPARIN SODIUM 30 MG: 30 INJECTION SUBCUTANEOUS at 08:36

## 2025-06-20 RX ADMIN — BENZONATATE 100 MG: 100 CAPSULE ORAL at 09:00

## 2025-06-20 RX ADMIN — Medication 1000 UNITS: at 08:35

## 2025-06-20 RX ADMIN — LEVALBUTEROL HYDROCHLORIDE 1.25 MG: 1.25 SOLUTION RESPIRATORY (INHALATION) at 10:06

## 2025-06-20 RX ADMIN — LEVALBUTEROL HYDROCHLORIDE 1.25 MG: 1.25 SOLUTION RESPIRATORY (INHALATION) at 20:30

## 2025-06-20 RX ADMIN — LEVALBUTEROL HYDROCHLORIDE 1.25 MG: 1.25 SOLUTION RESPIRATORY (INHALATION) at 14:22

## 2025-06-20 RX ADMIN — IPRATROPIUM BROMIDE 0.5 MG: 0.5 SOLUTION RESPIRATORY (INHALATION) at 10:06

## 2025-06-20 RX ADMIN — PROPRANOLOL HYDROCHLORIDE 120 MG: 60 CAPSULE, EXTENDED RELEASE ORAL at 08:35

## 2025-06-20 RX ADMIN — DOXYCYCLINE HYCLATE 100 MG: 100 CAPSULE ORAL at 08:35

## 2025-06-20 RX ADMIN — BENZONATATE 100 MG: 100 CAPSULE ORAL at 01:30

## 2025-06-20 NOTE — PROGRESS NOTES
Progress Note - Hospitalist   Name: Yesica Kulkarni 89 y.o. female I MRN: [id number]  Unit/Bed#: [facility]-01 I Date of Admission: 6/18/2025   Date of Service: 6/20/2025 I Hospital Day: 2    Assessment & Plan  Multifocal pneumonia  Presented with cough, congestion, dyspnea, chills over the past few days.  States began on Sunday when she was in [employer] and felt very cold.  COVID/flu/RSV negative  CT chest with multifocal pneumonia  Empiric ceftriaxone and add doxycycline  No SIRS criteria  legionella negative.  Strep pneumo positive. no sputum culture so far  Endorses occasional dysphagia, appreciate speech therapy evaluation  Trend fever curve, leukocytosis  weaned off oxygen.  Noted To have some wheezing will place on levalbuterol ipratropium and see how she does  Essential hypertension  PTA losartan 100 mg/HCTZ, Lasix 40 mg daily  Continue losartan for now, will hold diuretics as appeared hypovolemic on admission received small fluid bolus and was hyponatremic with sodium of 132  Takes propranolol for migraines  Trend blood pressures  Blood pressure is low normal.  Will decrease losartan to 50 mg daily for now and hypotension improving  Hyponatremia  Sodium 132 on admission  Hypovolemic  HCTZ and furosemide on hold  Sodium 133    VTE Pharmacologic Prophylaxis:   Moderate Risk (Score 3-4) - Pharmacological DVT Prophylaxis Ordered: enoxaparin (Lovenox).    Mobility:   Basic Mobility Inpatient Raw Score: 24  JH-HLM Goal: 8: Walk 250 feet or more  JH-HLM Achieved: 8: Walk 250 feet ot more  JH-HLM Goal achieved. Continue to encourage appropriate mobility.    Patient Centered Rounds: I performed bedside rounds with nursing staff today.   Discussions with Specialists or Other Care Team Provider: none    Education and Discussions with Family / Patient: will update family    Current Length of Stay: 2 day(s)  Current Patient Status: Inpatient   Certification Statement: The patient will continue to require additional inpatient  hospital stay due to pneumonia  Discharge Plan: Anticipate discharge tomorrow to home.    Code Status: Level 1 - Full Code    Subjective   Patient states she had a rough night with a lot of shortness of breath insomnia coughing.  Fortunately she is off oxygen this morning    Objective :  Temp:  [97 °F (36.1 °C)-97.7 °F (36.5 °C)] 97.7 °F (36.5 °C)  HR:  [74-77] 77  BP: (106-136)/(67-78) 134/77  Resp:  [17-19] 17  SpO2:  [90 %-95 %] 90 %  O2 Device: None (Room air)    Body mass index is 23.16 kg/m².     Input and Output Summary (last 24 hours):     Intake/Output Summary (Last 24 hours) at 6/20/2025 0951  Last data filed at 6/19/2025 1400  Gross per 24 hour   Intake 180 ml   Output --   Net 180 ml       Physical Exam  Vitals and nursing note reviewed.   Constitutional:       Appearance: Normal appearance.   HENT:      Head: Normocephalic and atraumatic.      Right Ear: External ear normal.      Left Ear: External ear normal.      Nose: Nose normal.      Mouth/Throat:      Pharynx: Oropharynx is clear.     Eyes:      Pupils: Pupils are equal, round, and reactive to light.       Cardiovascular:      Rate and Rhythm: Normal rate and regular rhythm.      Heart sounds: Normal heart sounds.   Pulmonary:      Effort: Pulmonary effort is normal.      Breath sounds: Wheezing and rhonchi present.   Abdominal:      General: Bowel sounds are normal.      Palpations: Abdomen is soft.      Tenderness: There is no abdominal tenderness.     Musculoskeletal:         General: Normal range of motion.      Cervical back: Normal range of motion and neck supple.     Skin:     General: Skin is warm and dry.      Capillary Refill: Capillary refill takes less than 2 seconds.     Neurological:      General: No focal deficit present.      Mental Status: She is alert and oriented to person, place, and time.     Psychiatric:         Mood and Affect: Mood normal.           Lines/Drains:              Lab Results: I have reviewed the following  results:   Results from last 7 days   Lab Units 06/18/25  1647   WBC Thousand/uL 9.18   HEMOGLOBIN g/dL 11.4*   HEMATOCRIT % 34.1*   PLATELETS Thousands/uL 204   SEGS PCT % 81*   LYMPHO PCT % 11*   MONO PCT % 7   EOS PCT % 1     Results from last 7 days   Lab Units 06/20/25  0501 06/18/25  1647   SODIUM mmol/L 133* 132*   POTASSIUM mmol/L 4.0 3.9   CHLORIDE mmol/L 98 94*   CO2 mmol/L 27 29   BUN mg/dL 29* 24   CREATININE mg/dL 0.99 1.13   ANION GAP mmol/L 8 9   CALCIUM mg/dL 8.7 9.1   ALBUMIN g/dL  --  4.1   TOTAL BILIRUBIN mg/dL  --  1.09*   ALK PHOS U/L  --  58   ALT U/L  --  26   AST U/L  --  29   GLUCOSE RANDOM mg/dL 113 109     Results from last 7 days   Lab Units 06/18/25  1647   INR  0.99             Results from last 7 days   Lab Units 06/19/25  0436 06/18/25  1647   LACTIC ACID mmol/L  --  1.1   PROCALCITONIN ng/ml 0.63* 0.53*       Recent Cultures (last 7 days):   Results from last 7 days   Lab Units 06/19/25  1055 06/19/25  0441 06/18/25  1945 06/18/25  1940 06/18/25  1704 06/18/25  1647   BLOOD CULTURE   --   --  Received in Microbiology Lab. Culture in Progress. Received in Microbiology Lab. Culture in Progress. No Growth at 24 hrs. No Growth at 24 hrs.   GRAM STAIN RESULT  1+ Epithelial cells per low power field*  1+ Polys*  Rare Gram positive cocci in pairs*  --   --   --   --   --    LEGIONELLA URINARY ANTIGEN   --  Negative  --   --   --   --        Imaging Results Review: I reviewed radiology reports from this admission including: CT chest.  Other Study Results Review: EKG was reviewed.     Last 24 Hours Medication List:     Current Facility-Administered Medications:     acetaminophen (TYLENOL) tablet 650 mg, Q6H PRN    benzonatate (TESSALON PERLES) capsule 100 mg, TID PRN    cefTRIAXone (ROCEPHIN) IVPB (premix in dextrose) 1,000 mg 50 mL, Q24H, Last Rate: 1,000 mg (06/20/25 0840)    Cholecalciferol (VITAMIN D3) tablet 1,000 Units, Daily    doxycycline hyclate (VIBRAMYCIN) capsule 100 mg, Q12H  CORBY    enoxaparin (LOVENOX) subcutaneous injection 30 mg, Daily    guaiFENesin (MUCINEX) 12 hr tablet 600 mg, BID    ipratropium (ATROVENT) 0.02 % inhalation solution 0.5 mg, TID    levalbuterol (XOPENEX) inhalation solution 0.63 mg, TID    losartan (COZAAR) tablet 50 mg, Daily    propranolol (INDERAL LA) 24 hr capsule 120 mg, Daily    Administrative Statements   Today, Patient Was Seen By: Alis Wetzel MD      **Please Note: This note may have been constructed using a voice recognition system.**

## 2025-06-20 NOTE — ASSESSMENT & PLAN NOTE
PTA losartan 100 mg/HCTZ, Lasix 40 mg daily  Continue losartan for now, will hold diuretics as appeared hypovolemic on admission received small fluid bolus and was hyponatremic with sodium of 132  Takes propranolol for migraines  Trend blood pressures  Blood pressure is low normal.  Will decrease losartan to 50 mg daily for now and hypotension improving

## 2025-06-20 NOTE — PLAN OF CARE
Problem: PAIN - ADULT  Goal: Verbalizes/displays adequate comfort level or baseline comfort level  Description: Interventions:  - Encourage patient to monitor pain and request assistance  - Assess pain using appropriate pain scale  - Administer analgesics as ordered based on type and severity of pain and evaluate response  - Implement non-pharmacological measures as appropriate and evaluate response  - Consider cultural and social influences on pain and pain management  - Notify physician/advanced practitioner if interventions unsuccessful or patient reports new pain  - Educate patient/family on pain management process including their role and importance of  reporting pain   - Provide non-pharmacologic/complimentary pain relief interventions  Outcome: Progressing     Problem: INFECTION - ADULT  Goal: Absence or prevention of progression during hospitalization  Description: INTERVENTIONS:  - Assess and monitor for signs and symptoms of infection  - Monitor lab/diagnostic results  - Monitor all insertion sites, i.e. indwelling lines, tubes, and drains  - Monitor endotracheal if appropriate and nasal secretions for changes in amount and color  - Nelson appropriate cooling/warming therapies per order  - Administer medications as ordered  - Instruct and encourage patient and family to use good hand hygiene technique  - Identify and instruct in appropriate isolation precautions for identified infection/condition  Outcome: Progressing  Goal: Absence of fever/infection during neutropenic period  Description: INTERVENTIONS:  - Monitor WBC  - Perform strict hand hygiene  - Limit to healthy visitors only  - No plants, dried, fresh or silk flowers with wilder in patient room  Outcome: Progressing     Problem: SAFETY ADULT  Goal: Patient will remain free of falls  Description: INTERVENTIONS:  - Educate patient/family on patient safety including physical limitations  - Instruct patient to call for assistance with activity   -  Consider consulting OT/PT to assist with strengthening/mobility based on AM PAC & JH-HLM score  - Consult OT/PT to assist with strengthening/mobility   - Keep Call bell within reach  - Keep bed low and locked with side rails adjusted as appropriate  - Keep care items and personal belongings within reach  - Initiate and maintain comfort rounds  - Make Fall Risk Sign visible to staff  - Offer Toileting every 2 Hours, in advance of need  - Initiate/Maintain BED AND CHAIR alarm  - Obtain necessary fall risk management equipment:   - Apply yellow socks and bracelet for high fall risk patients  - Consider moving patient to room near nurses station  Outcome: Progressing  Goal: Maintain or return to baseline ADL function  Description: INTERVENTIONS:  -  Assess patient's ability to carry out ADLs; assess patient's baseline for ADL function and identify physical deficits which impact ability to perform ADLs (bathing, care of mouth/teeth, toileting, grooming, dressing, etc.)  - Assess/evaluate cause of self-care deficits   - Assess range of motion  - Assess patient's mobility; develop plan if impaired  - Assess patient's need for assistive devices and provide as appropriate  - Encourage maximum independence but intervene and supervise when necessary  - Involve family in performance of ADLs  - Assess for home care needs following discharge   - Consider OT consult to assist with ADL evaluation and planning for discharge  - Provide patient education as appropriate  - Monitor functional capacity and physical performance, use of AM PAC & JH-HLM   - Monitor gait, balance and fatigue with ambulation    Outcome: Progressing  Goal: Maintains/Returns to pre admission functional level  Description: INTERVENTIONS:  - Perform AM-PAC 6 Click Basic Mobility/ Daily Activity assessment daily.  - Set and communicate daily mobility goal to care team and patient/family/caregiver.   - Collaborate with rehabilitation services on mobility goals if  consulted  - Perform Range of Motion 3 times a day.  - Reposition patient every 2 hours.  - Dangle patient 3 times a day  - Stand patient 3 times a day  - Ambulate patient 3 times a day  - Out of bed to chair 3 times a day   - Out of bed for meals 3 times a day  - Out of bed for toileting  - Record patient progress and toleration of activity level   Outcome: Progressing     Problem: DISCHARGE PLANNING  Goal: Discharge to home or other facility with appropriate resources  Description: INTERVENTIONS:  - Identify barriers to discharge w/patient and caregiver  - Arrange for needed discharge resources and transportation as appropriate  - Identify discharge learning needs (meds, wound care, etc.)  - Arrange for interpretive services to assist at discharge as needed  - Refer to Case Management Department for coordinating discharge planning if the patient needs post-hospital services based on physician/advanced practitioner order or complex needs related to functional status, cognitive ability, or social support system  Outcome: Progressing     Problem: Knowledge Deficit  Goal: Patient/family/caregiver demonstrates understanding of disease process, treatment plan, medications, and discharge instructions  Description: Complete learning assessment and assess knowledge base.  Interventions:  - Provide teaching at level of understanding  - Provide teaching via preferred learning methods  Outcome: Progressing

## 2025-06-20 NOTE — ASSESSMENT & PLAN NOTE
Presented with cough, congestion, dyspnea, chills over the past few days.  States began on Sunday when she was in [employer] and felt very cold.  COVID/flu/RSV negative  CT chest with multifocal pneumonia  Empiric ceftriaxone and add doxycycline  No SIRS criteria  legionella negative.  Strep pneumo positive. no sputum culture so far  Endorses occasional dysphagia, appreciate speech therapy evaluation  Trend fever curve, leukocytosis  weaned off oxygen.  Noted To have some wheezing will place on levalbuterol ipratropium and see how she does

## 2025-06-20 NOTE — PLAN OF CARE
Problem: PAIN - ADULT  Goal: Verbalizes/displays adequate comfort level or baseline comfort level  Description: Interventions:  - Encourage patient to monitor pain and request assistance  - Assess pain using appropriate pain scale  - Administer analgesics as ordered based on type and severity of pain and evaluate response  - Implement non-pharmacological measures as appropriate and evaluate response  - Consider cultural and social influences on pain and pain management  - Notify physician/advanced practitioner if interventions unsuccessful or patient reports new pain  - Educate patient/family on pain management process including their role and importance of  reporting pain   - Provide non-pharmacologic/complimentary pain relief interventions  Outcome: Progressing     Problem: INFECTION - ADULT  Goal: Absence or prevention of progression during hospitalization  Description: INTERVENTIONS:  - Assess and monitor for signs and symptoms of infection  - Monitor lab/diagnostic results  - Monitor all insertion sites, i.e. indwelling lines, tubes, and drains  - Monitor endotracheal if appropriate and nasal secretions for changes in amount and color  - [place] appropriate cooling/warming therapies per order  - Administer medications as ordered  - Instruct and encourage patient and family to use good hand hygiene technique  - Identify and instruct in appropriate isolation precautions for identified infection/condition  Outcome: Progressing  Goal: Absence of fever/infection during neutropenic period  Description: INTERVENTIONS:  - Monitor WBC  - Perform strict hand hygiene  - Limit to healthy visitors only  - No plants, dried, fresh or silk flowers with wilder in patient room  Outcome: Progressing     Problem: SAFETY ADULT  Goal: Patient will remain free of falls  Description: INTERVENTIONS:  - Educate patient/family on patient safety including physical limitations  - Instruct patient to call for assistance with activity   -  Consider consulting OT/PT to assist with strengthening/mobility based on AM PAC & JH-HLM score  - Consult OT/PT to assist with strengthening/mobility   - Keep Call bell within reach  - Keep bed low and locked with side rails adjusted as appropriate  - Keep care items and personal belongings within reach  - Initiate and maintain comfort rounds  - Make Fall Risk Sign visible to staff  - Offer Toileting every 2 Hours, in advance of need  - Initiate/Maintain BED AND CHAIR alarm  - Obtain necessary fall risk management equipment:   - Apply yellow socks and bracelet for high fall risk patients  - Consider moving patient to room near nurses station  Outcome: Progressing  Goal: Maintain or return to baseline ADL function  Description: INTERVENTIONS:  -  Assess patient's ability to carry out ADLs; assess patient's baseline for ADL function and identify physical deficits which impact ability to perform ADLs (bathing, care of mouth/teeth, toileting, grooming, dressing, etc.)  - Assess/evaluate cause of self-care deficits   - Assess range of motion  - Assess patient's mobility; develop plan if impaired  - Assess patient's need for assistive devices and provide as appropriate  - Encourage maximum independence but intervene and supervise when necessary  - Involve family in performance of ADLs  - Assess for home care needs following discharge   - Consider OT consult to assist with ADL evaluation and planning for discharge  - Provide patient education as appropriate  - Monitor functional capacity and physical performance, use of AM PAC & JH-HLM   - Monitor gait, balance and fatigue with ambulation    Outcome: Progressing  Goal: Maintains/Returns to pre admission functional level  Description: INTERVENTIONS:  - Perform AM-PAC 6 Click Basic Mobility/ Daily Activity assessment daily.  - Set and communicate daily mobility goal to care team and patient/family/caregiver.   - Collaborate with rehabilitation services on mobility goals if  consulted  - Perform Range of Motion 3 times a day.  - Reposition patient every 2 hours.  - Dangle patient 3 times a day  - Stand patient 3 times a day  - Ambulate patient 3 times a day  - Out of bed to chair 3 times a day   - Out of bed for meals 3 times a day  - Out of bed for toileting  - Record patient progress and toleration of activity level   Outcome: Progressing     Problem: DISCHARGE PLANNING  Goal: Discharge to home or other facility with appropriate resources  Description: INTERVENTIONS:  - Identify barriers to discharge w/patient and caregiver  - Arrange for needed discharge resources and transportation as appropriate  - Identify discharge learning needs (meds, wound care, etc.)  - Arrange for interpretive services to assist at discharge as needed  - Refer to Case Management Department for coordinating discharge planning if the patient needs post-hospital services based on physician/advanced practitioner order or complex needs related to functional status, cognitive ability, or social support system  Outcome: Progressing     Problem: Knowledge Deficit  Goal: Patient/family/caregiver demonstrates understanding of disease process, treatment plan, medications, and discharge instructions  Description: Complete learning assessment and assess knowledge base.  Interventions:  - Provide teaching at level of understanding  - Provide teaching via preferred learning methods  Outcome: Progressing

## 2025-06-21 VITALS
TEMPERATURE: 97 F | HEIGHT: 62 IN | DIASTOLIC BLOOD PRESSURE: 74 MMHG | WEIGHT: 126.6 LBS | RESPIRATION RATE: 18 BRPM | HEART RATE: 79 BPM | BODY MASS INDEX: 23.3 KG/M2 | OXYGEN SATURATION: 92 % | SYSTOLIC BLOOD PRESSURE: 129 MMHG

## 2025-06-21 LAB
ANION GAP SERPL CALCULATED.3IONS-SCNC: 7 MMOL/L (ref 4–13)
BACTERIA SPT RESP CULT: ABNORMAL
BUN SERPL-MCNC: 21 MG/DL (ref 5–25)
CALCIUM SERPL-MCNC: 8.5 MG/DL (ref 8.4–10.2)
CHLORIDE SERPL-SCNC: 100 MMOL/L (ref 96–108)
CO2 SERPL-SCNC: 27 MMOL/L (ref 21–32)
CREAT SERPL-MCNC: 0.88 MG/DL (ref 0.6–1.3)
GFR SERPL CREATININE-BSD FRML MDRD: 58 ML/MIN/1.73SQ M
GLUCOSE SERPL-MCNC: 101 MG/DL (ref 65–140)
GRAM STN SPEC: ABNORMAL
POTASSIUM SERPL-SCNC: 3.8 MMOL/L (ref 3.5–5.3)
SODIUM SERPL-SCNC: 134 MMOL/L (ref 135–147)

## 2025-06-21 PROCEDURE — 94760 N-INVAS EAR/PLS OXIMETRY 1: CPT

## 2025-06-21 PROCEDURE — 99239 HOSP IP/OBS DSCHRG MGMT >30: CPT | Performed by: FAMILY MEDICINE

## 2025-06-21 PROCEDURE — 94640 AIRWAY INHALATION TREATMENT: CPT

## 2025-06-21 PROCEDURE — 80048 BASIC METABOLIC PNL TOTAL CA: CPT | Performed by: FAMILY MEDICINE

## 2025-06-21 RX ORDER — LOSARTAN POTASSIUM 50 MG/1
50 TABLET ORAL DAILY
Qty: 30 TABLET | Refills: 0 | Status: SHIPPED | OUTPATIENT
Start: 2025-06-21 | End: 2025-07-21

## 2025-06-21 RX ORDER — DOXYCYCLINE 100 MG/1
100 CAPSULE ORAL EVERY 12 HOURS SCHEDULED
Qty: 8 CAPSULE | Refills: 0 | Status: SHIPPED | OUTPATIENT
Start: 2025-06-21 | End: 2025-06-25

## 2025-06-21 RX ORDER — CEFPODOXIME PROXETIL 200 MG/1
200 TABLET, FILM COATED ORAL 2 TIMES DAILY
Qty: 10 TABLET | Refills: 0 | Status: SHIPPED | OUTPATIENT
Start: 2025-06-21 | End: 2025-06-26

## 2025-06-21 RX ORDER — GUAIFENESIN 600 MG/1
600 TABLET, EXTENDED RELEASE ORAL 2 TIMES DAILY
Qty: 10 TABLET | Refills: 0 | Status: SHIPPED | OUTPATIENT
Start: 2025-06-21 | End: 2025-06-26

## 2025-06-21 RX ORDER — ACETAMINOPHEN 325 MG/1
650 TABLET ORAL EVERY 6 HOURS PRN
Start: 2025-06-21

## 2025-06-21 RX ORDER — LEVALBUTEROL INHALATION SOLUTION 1.25 MG/3ML
1.25 SOLUTION RESPIRATORY (INHALATION)
Qty: 270 ML | Refills: 0 | Status: SHIPPED | OUTPATIENT
Start: 2025-06-21 | End: 2025-07-21

## 2025-06-21 RX ADMIN — LEVALBUTEROL HYDROCHLORIDE 1.25 MG: 1.25 SOLUTION RESPIRATORY (INHALATION) at 07:24

## 2025-06-21 RX ADMIN — LOSARTAN POTASSIUM 50 MG: 50 TABLET, FILM COATED ORAL at 07:52

## 2025-06-21 RX ADMIN — CEFTRIAXONE 1000 MG: 1 INJECTION, SOLUTION INTRAVENOUS at 07:53

## 2025-06-21 RX ADMIN — PROPRANOLOL HYDROCHLORIDE 120 MG: 60 CAPSULE, EXTENDED RELEASE ORAL at 07:52

## 2025-06-21 RX ADMIN — Medication 1000 UNITS: at 07:52

## 2025-06-21 RX ADMIN — IPRATROPIUM BROMIDE 0.5 MG: 0.5 SOLUTION RESPIRATORY (INHALATION) at 07:24

## 2025-06-21 RX ADMIN — IPRATROPIUM BROMIDE 0.5 MG: 0.5 SOLUTION RESPIRATORY (INHALATION) at 13:32

## 2025-06-21 RX ADMIN — DOXYCYCLINE HYCLATE 100 MG: 100 CAPSULE ORAL at 07:52

## 2025-06-21 RX ADMIN — GUAIFENESIN 600 MG: 600 TABLET ORAL at 07:52

## 2025-06-21 RX ADMIN — LEVALBUTEROL HYDROCHLORIDE 1.25 MG: 1.25 SOLUTION RESPIRATORY (INHALATION) at 13:32

## 2025-06-21 RX ADMIN — ENOXAPARIN SODIUM 30 MG: 30 INJECTION SUBCUTANEOUS at 07:52

## 2025-06-21 NOTE — UTILIZATION REVIEW
Continued Stay Review    SEE INITIAL REVIEW AT BOTTOM    Date: 6/20  Day 3: Has surpassed a 2nd midnight with active treatments and services.    Current Patient Class: Inpatient  Current Level of Care: med/surg    HPI:89 y.o. female initially admitted on 6/18   Current Diagnosis: Multifocal pneumonia     Assessment/Plan: Pt states she had a rough night with a lot of sob, insomnia and coughing. Off O2 currently with sats 92-93%.   Wheezing and rhonchi present on exam. Continue IV ceftriaxone, tasneem nebs. Supportive care. SCDs, OOB. Continue PTA po meds.     Medications:   Scheduled Medications:  cefTRIAXone, 1,000 mg, Intravenous, Q24H  Cholecalciferol, 1,000 Units, Oral, Daily  doxycycline hyclate, 100 mg, Oral, Q12H TASNEEM  enoxaparin, 30 mg, Subcutaneous, Daily  guaiFENesin, 600 mg, Oral, BID  ipratropium, 0.5 mg, Nebulization, TID  levalbuterol, 1.25 mg, Nebulization, TID  losartan, 50 mg, Oral, Daily  propranolol, 120 mg, Oral, Daily    PRN Meds:  acetaminophen, 650 mg, Oral, Q6H PRN  benzonatate, 100 mg, Oral, TID PRN      Discharge Plan: TBD    Vital Signs (last 3 days)       Date/Time Temp Pulse Resp BP MAP (mmHg) SpO2 Calculated FIO2 (%) - Nasal Cannula Nasal Cannula O2 Flow Rate (L/min) O2 Device Patient Position - Orthostatic VS Pain    06/20/25 21:55:04 97 °F (36.1 °C) 73 18 114/62 79 93 % -- -- -- -- --    06/20/25 2030 -- -- -- -- -- 93 % -- -- -- -- --    06/20/25 2000 -- -- -- -- -- 93 % -- -- None (Room air) -- No Pain    06/20/25 1516 -- -- -- -- -- 90 % -- -- -- -- --    06/20/25 15:08:53 97.5 °F (36.4 °C) -- 18 106/62 77 -- -- -- -- -- --    06/20/25 1422 -- -- -- -- -- 90 % -- -- -- -- --    06/20/25 1000 -- -- -- -- -- 94 % -- -- None (Room air) -- No Pain    06/20/25 07:04:10 97.7 °F (36.5 °C) 77 17 134/77 96 90 % -- -- -- -- --    06/19/25 21:52:03 97 °F (36.1 °C) 75 19 136/78 97 94 % -- -- -- -- --    06/19/25 2022 -- -- -- -- -- 95 % -- -- None (Room air) -- No Pain    06/19/25 1600 97.3 °F (36.3  °C) 74 18 106/67 -- -- -- -- -- -- --    06/19/25 15:38:58 97.3 °F (36.3 °C) 74 18 106/67 80 95 % -- -- -- -- --    06/19/25 0900 -- -- -- -- -- 96 % -- -- -- -- --    06/19/25 0706 97.5 °F (36.4 °C) 73 18 117/78 86 96 % -- -- None (Room air) Sitting No Pain    06/19/25 0453 97 °F (36.1 °C) 73 18 135/83 95 -- -- -- -- Lying --    06/18/25 2123 -- -- -- 100/68 -- -- 28 2 L/min Nasal cannula -- --    06/18/25 21:05:37 -- 88 -- 93/44 60 95 % -- -- -- -- --    06/18/25 2100 -- -- -- -- -- 93 % -- -- None (Room air) -- No Pain    06/18/25 1945 -- 89 16 108/59 79 93 % -- -- None (Room air) Lying No Pain    06/18/25 1915 -- 91 18 99/53 74 92 % -- -- None (Room air) Lying --    06/18/25 1845 -- 91 18 105/50 67 90 % -- -- None (Room air) Lying --    06/18/25 1830 -- 91 18 99/57 77 92 % -- -- None (Room air) Lying --       Pertinent Labs/Diagnostic Results:   Results from last 7 days   Lab Units 06/20/25  0501 06/18/25  1647   SODIUM mmol/L 133* 132*   POTASSIUM mmol/L 4.0 3.9   CHLORIDE mmol/L 98 94*   CO2 mmol/L 27 29   ANION GAP mmol/L 8 9   BUN mg/dL 29* 24   CREATININE mg/dL 0.99 1.13   EGFR ml/min/1.73sq m 50 42   CALCIUM mg/dL 8.7 9.1   MAGNESIUM mg/dL  --  1.8*     Results from last 7 days   Lab Units 06/20/25  0501 06/18/25  1647   GLUCOSE RANDOM mg/dL 113 109         Results from last 7 days   Lab Units 06/19/25  0441   CLARITY UA  Clear   COLOR UA  Wilma   SPEC GRAV UA  1.010   PH UA  6.0   GLUCOSE UA mg/dl Negative   KETONES UA mg/dl Negative   BLOOD UA  Trace-Intact*   PROTEIN UA mg/dl 30 (1+)*   NITRITE UA  Negative   BILIRUBIN UA  Negative   UROBILINOGEN UA E.U./dl 0.2   LEUKOCYTES UA  Negative   WBC UA /hpf 2-4   RBC UA /hpf 0-1*   BACTERIA UA /hpf Occasional   EPITHELIAL CELLS WET PREP /hpf Occasional     Results from last 7 days   Lab Units 06/19/25  0441   STREP PNEUMONIAE ANTIGEN, URINE  Positive*   LEGIONELLA URINARY ANTIGEN  Negative       Results from last 7 days   Lab Units 06/19/25  1055  06/18/25  1945 06/18/25  1940 06/18/25  1704 06/18/25  1647   BLOOD CULTURE   --  No Growth at 24 hrs. No Growth at 24 hrs. No Growth at 48 hrs. No Growth at 48 hrs.   SPUTUM CULTURE  Few Colonies of  --   --   --   --    GRAM STAIN RESULT  1+ Epithelial cells per low power field*  1+ Polys*  Rare Gram positive cocci in pairs*  --   --   --   --        Network Utilization Review Department  ATTENTION: Please call with any questions or concerns to [phone] and carefully listen to the prompts so that you are directed to the right person. All voicemails are confidential.   For Discharge needs, contact Care Management DC Support Team at [phone] opt. 2  Send all requests for admission clinical reviews, approved or denied determinations and any other requests to dedicated fax number below belonging to the [facility] where the patient is receiving treatment. List of dedicated fax numbers for the Facilities:  FACILITY NAME UR FAX NUMBER   ADMISSION DENIALS (Administrative/Medical Necessity) [phone]   DISCHARGE SUPPORT TEAM (NETWORK) [phone]   PARENT CHILD HEALTH (Maternity/NICU/Pediatrics) [phone]   [facility] [phone]   [facility] [phone]   [facility] [phone]   [facility] [phone]   [facility] [phone]   [facility] [phone]   [facility] [phone]   [facility] [phone]   [facility] [phone]   [facility] [phone]   [facility] [phone]   [facility] [phone]

## 2025-06-21 NOTE — CASE MANAGEMENT
Case Management Discharge Planning Note    Patient name Yesica Kulkarni  Location /-01 MRN [id number]  : 1934 Date 2025       Current Admission Date: 2025  Current Admission Diagnosis:Multifocal pneumonia   Patient Active Problem List    Diagnosis Date Noted    Essential hypertension 2025    Hyponatremia 2025    Multifocal pneumonia 2025      LOS (days): 3  Geometric Mean LOS (GMLOS) (days): 2.8  Days to GMLOS:0.2     OBJECTIVE:  Risk of Unplanned Readmission Score: 11.07         Current admission status: Inpatient   Preferred Pharmacy:   [facility]/pharmacy #[id number] - [place], [place] - [facility] [address] [address]  Phone: [phone] Fax: [phone]    Primary Care Provider: Theron Stallworth MD    Primary Insurance: [facility]  Secondary Insurance:     DISCHARGE DETAILS:    Discharge planning discussed with:: Patient  Freedom of Choice: Yes  Comments - Freedom of Choice: no needs  CM contacted family/caregiver?: No- see comments (declined)  Were Treatment Team discharge recommendations reviewed with patient/caregiver?: Yes  Did patient/caregiver verbalize understanding of patient care needs?: Yes  Were patient/caregiver advised of the risks associated with not following Treatment Team discharge recommendations?: Yes         Requested Home Health Care         Is the patient interested in HHC at discharge?: No    DME Referral Provided  Referral made for DME?: No         Would you like to participate in our [facility] service program?  : No - Declined    Treatment Team Recommendation: Home  Expected Discharge Disposition: Home or Self Care     Transport at Discharge : Family     IMM Given (Date):: 25  IMM Given to:: Patient          CM spoke to patient at the bedside, reviewed DC IMM with patient and informed that patient can stay an additional 4 hours for reconsidering appealing the discharge as the medicare rights  were review on the day of discharge. Pt verbalized understanding and feels ready to go home and does not intend to stay 4 hours to reconsider. IMM signed.   Patient's daughter will be here later today for DC.   Patient reports to live alone but has a lot of family close. She is very active and declined any CM needs.     CM to follow patient's care and discharge needs.

## 2025-06-21 NOTE — PLAN OF CARE
Problem: PAIN - ADULT  Goal: Verbalizes/displays adequate comfort level or baseline comfort level  Description: Interventions:  - Encourage patient to monitor pain and request assistance  - Assess pain using appropriate pain scale  - Administer analgesics as ordered based on type and severity of pain and evaluate response  - Implement non-pharmacological measures as appropriate and evaluate response  - Consider cultural and social influences on pain and pain management  - Notify physician/advanced practitioner if interventions unsuccessful or patient reports new pain  - Educate patient/family on pain management process including their role and importance of  reporting pain   - Provide non-pharmacologic/complimentary pain relief interventions  Outcome: Progressing     Problem: INFECTION - ADULT  Goal: Absence or prevention of progression during hospitalization  Description: INTERVENTIONS:  - Assess and monitor for signs and symptoms of infection  - Monitor lab/diagnostic results  - Monitor all insertion sites, i.e. indwelling lines, tubes, and drains  - Monitor endotracheal if appropriate and nasal secretions for changes in amount and color  - [place] appropriate cooling/warming therapies per order  - Administer medications as ordered  - Instruct and encourage patient and family to use good hand hygiene technique  - Identify and instruct in appropriate isolation precautions for identified infection/condition  Outcome: Progressing  Goal: Absence of fever/infection during neutropenic period  Description: INTERVENTIONS:  - Monitor WBC  - Perform strict hand hygiene  - Limit to healthy visitors only  - No plants, dried, fresh or silk flowers with wilder in patient room  Outcome: Progressing     Problem: SAFETY ADULT  Goal: Patient will remain free of falls  Description: INTERVENTIONS:  - Educate patient/family on patient safety including physical limitations  - Instruct patient to call for assistance with activity   -  Consider consulting OT/PT to assist with strengthening/mobility based on AM PAC & JH-HLM score  - Consult OT/PT to assist with strengthening/mobility   - Keep Call bell within reach  - Keep bed low and locked with side rails adjusted as appropriate  - Keep care items and personal belongings within reach  - Initiate and maintain comfort rounds  - Make Fall Risk Sign visible to staff  - Offer Toileting every 2 Hours, in advance of need  - Initiate/Maintain BED AND CHAIR alarm  - Obtain necessary fall risk management equipment:   - Apply yellow socks and bracelet for high fall risk patients  - Consider moving patient to room near nurses station  Outcome: Progressing  Goal: Maintain or return to baseline ADL function  Description: INTERVENTIONS:  -  Assess patient's ability to carry out ADLs; assess patient's baseline for ADL function and identify physical deficits which impact ability to perform ADLs (bathing, care of mouth/teeth, toileting, grooming, dressing, etc.)  - Assess/evaluate cause of self-care deficits   - Assess range of motion  - Assess patient's mobility; develop plan if impaired  - Assess patient's need for assistive devices and provide as appropriate  - Encourage maximum independence but intervene and supervise when necessary  - Involve family in performance of ADLs  - Assess for home care needs following discharge   - Consider OT consult to assist with ADL evaluation and planning for discharge  - Provide patient education as appropriate  - Monitor functional capacity and physical performance, use of AM PAC & JH-HLM   - Monitor gait, balance and fatigue with ambulation    Outcome: Progressing  Goal: Maintains/Returns to pre admission functional level  Description: INTERVENTIONS:  - Perform AM-PAC 6 Click Basic Mobility/ Daily Activity assessment daily.  - Set and communicate daily mobility goal to care team and patient/family/caregiver.   - Collaborate with rehabilitation services on mobility goals if  consulted  - Perform Range of Motion 3 times a day.  - Reposition patient every 2 hours.  - Dangle patient 3 times a day  - Stand patient 3 times a day  - Ambulate patient 3 times a day  - Out of bed to chair 3 times a day   - Out of bed for meals 3 times a day  - Out of bed for toileting  - Record patient progress and toleration of activity level   Outcome: Progressing     Problem: DISCHARGE PLANNING  Goal: Discharge to home or other facility with appropriate resources  Description: INTERVENTIONS:  - Identify barriers to discharge w/patient and caregiver  - Arrange for needed discharge resources and transportation as appropriate  - Identify discharge learning needs (meds, wound care, etc.)  - Arrange for interpretive services to assist at discharge as needed  - Refer to Case Management Department for coordinating discharge planning if the patient needs post-hospital services based on physician/advanced practitioner order or complex needs related to functional status, cognitive ability, or social support system  Outcome: Progressing     Problem: Knowledge Deficit  Goal: Patient/family/caregiver demonstrates understanding of disease process, treatment plan, medications, and discharge instructions  Description: Complete learning assessment and assess knowledge base.  Interventions:  - Provide teaching at level of understanding  - Provide teaching via preferred learning methods  Outcome: Progressing

## 2025-06-21 NOTE — DISCHARGE SUMMARY
Discharge Summary - Hospitalist   Name: Yesica Kulkarni 89 y.o. female I MRN: [id number]  Unit/Bed#: -01 I Date of Admission: 6/18/2025   Date of Service: 6/21/2025 I Hospital Day: 3     Assessment & Plan  Multifocal pneumonia  Presented with cough, congestion, dyspnea, chills over the past few days.  States began on Sunday when she was in [employer] and felt very cold.  COVID/flu/RSV negative  CT chest with multifocal pneumonia  Empiric ceftriaxone and add doxycycline  No SIRS criteria  legionella negative.  Strep pneumo positive. no sputum culture so far  Endorses occasional dysphagia, appreciate speech therapy evaluation  Trend fever curve, leukocytosis  weaned off oxygen.  Yesterday Noted To have some wheezing will place on levalbuterol ipratropium and doing better today  Will discharge home today on a course of oral antibiotics  Essential hypertension  PTA losartan 100 mg/HCTZ, Lasix 40 mg daily  Continue losartan for now, will hold diuretics as appeared hypovolemic on admission received small fluid bolus and was hyponatremic with sodium of 132  Takes propranolol for migraines  Trend blood pressures  Blood pressure is low normal.  Will decrease losartan to 50 mg daily for now and hypotension improving  Hyponatremia  Sodium 132 on admission  Hypovolemic  HCTZ and furosemide on hold  Sodium 133-134     Medical Problems      Discharging Physician / Practitioner: Alis Wetzel MD  PCP: Theron Stallworth MD  Admission Date:   Admission Orders (From admission, onward)       Ordered        06/18/25 2005  INPATIENT ADMISSION  Once                          Discharge Date: 06/21/25    Next Steps for Physician/AP Assuming Care:  Ct chest in 6 weeks and fu in office in 1 week  Bmp in 1 week    Test Results Pending at Discharge (will require follow up):  none    Medication Changes for Discharge & Rationale:   See after visit summary for reconciled discharge medications provided to patient and/or family.     Consultations During  "Hospital Stay:  none    Procedures Performed:   none    Significant Findings / Test Results:   CT pe study w abdomen pelvis w contrast  Result Date: 6/18/2025  Impression: Negative for acute pulmonary thromboembolism. Multifocal pneumonia in the right lung. No acute findings in the abdomen or pelvis. The study was marked in EPIC for immediate notification. Computerized Assisted Algorithm (CAA) may have aided analysis of applicable images. Workstation performed: [id number]      Incidental Findings:       Hospital Course:   Yesica Kulkarni is a 89 y.o. female patient who originally presented to the hospital on 6/18/2025 due to multifocal pneumonia and hyponatremia.  Held Lasix and HCTZ and treated with antibiotics patient's strep pneumo was positive.    Clinically improving will discharge home with a course of oral antibiotics continue nebulizer treatments and repeat BMP in 1 week and follow-up with PCP will hold HCTZ and also decrease dose of losartan due to the hypotension and hyponatremia.  Recommend doing follow-up CT chest in 6 weeks to ensure resolution of pneumonia      Please see above list of diagnoses and related plan for additional information.     Discharge Day Visit / Exam:   Subjective: Patient denies any chest pain or shortness of breath feeling better today  Vitals: Blood Pressure: 129/74 (06/21/25 0707)  Pulse: 79 (06/21/25 0707)  Temperature: (!) 97 °F (36.1 °C) (06/21/25 0707)  Temp Source: Temporal (06/19/25 1600)  Respirations: 18 (06/20/25 2155)  Height: 5' 2\" (157.5 cm) (06/19/25 1600)  Weight - Scale: 57.4 kg (126 lb 9.6 oz) (06/19/25 1600)  SpO2: 92 % (06/21/25 0707)  Physical Exam  Vitals and nursing note reviewed.   Constitutional:       Appearance: Normal appearance.   HENT:      Head: Normocephalic and atraumatic.      Right Ear: External ear normal.      Left Ear: External ear normal.      Nose: Nose normal.      Mouth/Throat:      Pharynx: Oropharynx is clear.     Eyes:      Pupils: Pupils " are equal, round, and reactive to light.       Cardiovascular:      Rate and Rhythm: Normal rate and regular rhythm.      Heart sounds: Normal heart sounds.   Pulmonary:      Effort: Pulmonary effort is normal.      Breath sounds: Normal breath sounds.   Abdominal:      General: Bowel sounds are normal.      Palpations: Abdomen is soft.      Tenderness: There is no abdominal tenderness.     Musculoskeletal:         General: Normal range of motion.      Cervical back: Normal range of motion and neck supple.     Skin:     General: Skin is warm and dry.      Capillary Refill: Capillary refill takes less than 2 seconds.     Neurological:      General: No focal deficit present.      Mental Status: She is alert and oriented to person, place, and time.     Psychiatric:         Mood and Affect: Mood normal.            Discussion with Family: update daughter    Discharge instructions/Information to patient and family:   See after visit summary for information provided to patient and family.      Provisions for Follow-Up Care:  See after visit summary for information related to follow-up care and any pertinent home health orders.      Mobility at time of Discharge:   Basic Mobility Inpatient Raw Score: 24  JH-HLM Goal: 8: Walk 250 feet or more  JH-HLM Achieved: 8: Walk 250 feet ot more  HLM Goal NOT achieved. Continue to encourage mobility in post discharge setting.     Disposition:   Home    Planned Readmission: none     Administrative Statements   Discharge Statement:  I have spent a total time of 35 minutes in caring for this patient on the day of the visit/encounter. .    **Please Note: This note may have been constructed using a voice recognition system**

## 2025-06-21 NOTE — ASSESSMENT & PLAN NOTE
Presented with cough, congestion, dyspnea, chills over the past few days.  States began on Sunday when she was in [employer] and felt very cold.  COVID/flu/RSV negative  CT chest with multifocal pneumonia  Empiric ceftriaxone and add doxycycline  No SIRS criteria  legionella negative.  Strep pneumo positive. no sputum culture so far  Endorses occasional dysphagia, appreciate speech therapy evaluation  Trend fever curve, leukocytosis  weaned off oxygen.  Yesterday Noted To have some wheezing will place on levalbuterol ipratropium and doing better today  Will discharge home today on a course of oral antibiotics

## 2025-06-23 LAB
BACTERIA BLD CULT: NORMAL
BACTERIA BLD CULT: NORMAL

## 2025-06-23 NOTE — UTILIZATION REVIEW
NOTIFICATION OF ADMISSION DISCHARGE   This is a Notification of Discharge from [facility]. Please be advised that this patient has been discharge from our facility. Below you will find the admission and discharge date and time including the patient’s disposition.   UTILIZATION REVIEW CONTACT:  Utilization Review Assistants  Network Utilization Review Department  Phone: [phone] x carefully listen to the prompts. All voicemails are confidential.  Email: [email or web address]     ADMISSION INFORMATION  PRESENTATION DATE: 6/18/2025  4:32 PM  OBERVATION ADMISSION DATE: N/A  INPATIENT ADMISSION DATE: 6/18/25  8:05 PM   DISCHARGE DATE: 6/21/2025  2:12 PM   DISPOSITION:Home/Self Care    Network Utilization Review Department  ATTENTION: Please call with any questions or concerns to [phone] and carefully listen to the prompts so that you are directed to the right person. All voicemails are confidential.   For Discharge needs, contact Care Management DC Support Team at [phone] opt. 2  Send all requests for admission clinical reviews, approved or denied determinations and any other requests to dedicated fax number below belonging to the campus where the patient is receiving treatment. List of dedicated fax numbers for the Facilities:  FACILITY NAME UR FAX NUMBER   ADMISSION DENIALS (Administrative/Medical Necessity) [phone]   DISCHARGE SUPPORT TEAM ([facility]) [phone]   PARENT CHILD HEALTH (Maternity/NICU/Pediatrics) [phone]   [facility] [phone]   [facility] [phone]   [facility] [phone]   [facility] [phone]   [facility] [phone]   [facility] [phone]   [facility] [phone]   [facility] [phone]   [facility] [phone]   Pending sale to [facility] [phone]   [facility] [phone]   [facility] [phone]

## 2025-06-24 ENCOUNTER — TELEPHONE (OUTPATIENT)
Age: OVER 89
End: 2025-06-24

## 2025-06-24 LAB
BACTERIA BLD CULT: NORMAL
BACTERIA BLD CULT: NORMAL

## 2025-06-24 NOTE — TELEPHONE ENCOUNTER
Pt called in called following up on vm she received , I advise the patient daughter we received a referral , daughter stated her mother already established with Pulmonary doctor . Closing out the referral

## 2025-06-27 LAB
BUN SERPL-MCNC: 18 MG/DL (ref 7–25)
BUN/CREAT SERPL: ABNORMAL (CALC) (ref 6–22)
CALCIUM SERPL-MCNC: 9 MG/DL (ref 8.6–10.4)
CHLORIDE SERPL-SCNC: 98 MMOL/L (ref 98–110)
CO2 SERPL-SCNC: 28 MMOL/L (ref 20–32)
CREAT SERPL-MCNC: 0.92 MG/DL (ref 0.6–0.95)
GFR/BSA.PRED SERPLBLD CYS-BASED-ARV: 59 ML/MIN/1.73M2
GLUCOSE SERPL-MCNC: 96 MG/DL (ref 65–99)
POTASSIUM SERPL-SCNC: 4.3 MMOL/L (ref 3.5–5.3)
SODIUM SERPL-SCNC: 134 MMOL/L (ref 135–146)

## 2025-07-18 PROBLEM — J18.9 MULTIFOCAL PNEUMONIA: Status: RESOLVED | Noted: 2025-06-18 | Resolved: 2025-07-18

## 2025-07-21 ENCOUNTER — HOSPITAL ENCOUNTER (OUTPATIENT)
Dept: CT IMAGING | Facility: HOSPITAL | Age: OVER 89
Discharge: HOME | End: 2025-07-21
Attending: FAMILY MEDICINE
Payer: COMMERCIAL

## 2025-07-21 DIAGNOSIS — J18.9 PNEUMONIA: ICD-10-CM

## 2025-07-21 PROCEDURE — 71250 CT THORAX DX C-: CPT
